# Patient Record
Sex: FEMALE | Race: WHITE | NOT HISPANIC OR LATINO | Employment: FULL TIME | URBAN - METROPOLITAN AREA
[De-identification: names, ages, dates, MRNs, and addresses within clinical notes are randomized per-mention and may not be internally consistent; named-entity substitution may affect disease eponyms.]

---

## 2022-01-10 ENCOUNTER — OFFICE VISIT (OUTPATIENT)
Dept: FAMILY MEDICINE CLINIC | Facility: CLINIC | Age: 57
End: 2022-01-10

## 2022-01-10 VITALS
HEIGHT: 67 IN | OXYGEN SATURATION: 96 % | SYSTOLIC BLOOD PRESSURE: 128 MMHG | BODY MASS INDEX: 27.47 KG/M2 | DIASTOLIC BLOOD PRESSURE: 74 MMHG | WEIGHT: 175 LBS | TEMPERATURE: 98.1 F | HEART RATE: 94 BPM

## 2022-01-10 DIAGNOSIS — M51.36 DDD (DEGENERATIVE DISC DISEASE), LUMBAR: ICD-10-CM

## 2022-01-10 DIAGNOSIS — G89.29 CHRONIC HIP PAIN, BILATERAL: ICD-10-CM

## 2022-01-10 DIAGNOSIS — M16.0 OSTEOARTHRITIS OF BOTH HIPS, UNSPECIFIED OSTEOARTHRITIS TYPE: ICD-10-CM

## 2022-01-10 DIAGNOSIS — M54.41 CHRONIC BILATERAL LOW BACK PAIN WITH RIGHT-SIDED SCIATICA: ICD-10-CM

## 2022-01-10 DIAGNOSIS — G89.29 CHRONIC BILATERAL LOW BACK PAIN WITH RIGHT-SIDED SCIATICA: ICD-10-CM

## 2022-01-10 DIAGNOSIS — M25.552 CHRONIC HIP PAIN, BILATERAL: ICD-10-CM

## 2022-01-10 DIAGNOSIS — Z76.89 ENCOUNTER TO ESTABLISH CARE: Primary | ICD-10-CM

## 2022-01-10 DIAGNOSIS — M25.551 CHRONIC HIP PAIN, BILATERAL: ICD-10-CM

## 2022-01-10 PROBLEM — Z72.0 TOBACCO ABUSE: Status: ACTIVE | Noted: 2022-01-10

## 2022-01-10 PROBLEM — Z90.711 S/P PARTIAL HYSTERECTOMY: Status: ACTIVE | Noted: 2022-01-10

## 2022-01-10 PROBLEM — M54.12 BRACHIAL NEURITIS OR RADICULITIS: Status: ACTIVE | Noted: 2017-06-21

## 2022-01-10 PROBLEM — G47.00 CANNOT SLEEP: Status: ACTIVE | Noted: 2022-01-10

## 2022-01-10 PROBLEM — J42 CHRONIC BRONCHITIS: Status: ACTIVE | Noted: 2022-01-10

## 2022-01-10 PROBLEM — M67.912 DISORDER OF LEFT ROTATOR CUFF: Status: ACTIVE | Noted: 2017-06-21

## 2022-01-10 PROBLEM — K58.9 IBS (IRRITABLE BOWEL SYNDROME): Status: ACTIVE | Noted: 2022-01-10

## 2022-01-10 PROBLEM — F32.A DEPRESSION: Status: ACTIVE | Noted: 2022-01-10

## 2022-01-10 PROBLEM — C44.91 BASAL CELL CANCER: Status: ACTIVE | Noted: 2022-01-10

## 2022-01-10 PROBLEM — M54.2 NECK PAIN: Status: ACTIVE | Noted: 2017-06-21

## 2022-01-10 PROBLEM — M50.30 DDD (DEGENERATIVE DISC DISEASE), CERVICAL: Status: ACTIVE | Noted: 2017-06-21

## 2022-01-10 PROCEDURE — 96372 THER/PROPH/DIAG INJ SC/IM: CPT | Performed by: NURSE PRACTITIONER

## 2022-01-10 PROCEDURE — 99203 OFFICE O/P NEW LOW 30 MIN: CPT | Performed by: NURSE PRACTITIONER

## 2022-01-10 RX ORDER — BUPROPION HYDROCHLORIDE 300 MG/1
300 TABLET ORAL EVERY MORNING
COMMUNITY
Start: 2022-01-05

## 2022-01-10 RX ORDER — OXCARBAZEPINE 600 MG/1
600 TABLET, FILM COATED ORAL 2 TIMES DAILY
COMMUNITY
Start: 2021-12-08

## 2022-01-10 RX ORDER — TIZANIDINE 4 MG/1
4 TABLET ORAL NIGHTLY PRN
Qty: 30 TABLET | Refills: 0 | Status: SHIPPED | OUTPATIENT
Start: 2022-01-10

## 2022-01-10 RX ORDER — HYDROXYZINE PAMOATE 25 MG/1
CAPSULE ORAL
COMMUNITY
Start: 2021-11-18 | End: 2022-01-14

## 2022-01-10 RX ORDER — METHYLPREDNISOLONE SODIUM SUCCINATE 40 MG/ML
40 INJECTION, POWDER, LYOPHILIZED, FOR SOLUTION INTRAMUSCULAR; INTRAVENOUS ONCE
Status: COMPLETED | OUTPATIENT
Start: 2022-01-10 | End: 2022-01-10

## 2022-01-10 RX ORDER — TRAZODONE HYDROCHLORIDE 100 MG/1
100 TABLET ORAL
COMMUNITY
Start: 2021-12-08

## 2022-01-10 RX ORDER — METHYLPREDNISOLONE 4 MG/1
TABLET ORAL
Qty: 21 EACH | Refills: 0 | Status: SHIPPED | OUTPATIENT
Start: 2022-01-10

## 2022-01-10 RX ADMIN — METHYLPREDNISOLONE SODIUM SUCCINATE 40 MG: 40 INJECTION, POWDER, LYOPHILIZED, FOR SOLUTION INTRAMUSCULAR; INTRAVENOUS at 10:57

## 2022-01-10 NOTE — PROGRESS NOTES
Please mail letter to patient stating    Deb the x-rays of the lumbar spine were read as multilevel degenerative changes with the greatest level at the lower lumbar spine so we will go ahead and proceed with that MRI if possible and then if it is not approved at this point in time then we will recommend physical therapy first and then attempt to reorder the MRI
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
## 2022-01-10 NOTE — PROGRESS NOTES
Please mail letter to patient stating    Deb at the x-rays of the hips were read as mild bilateral hip osteoarthritis and that there was mild marginal spurring at the left and right femoral heads and then we will just wait and see how you respond to the steroid pack and the shot that we gave and then we do already have the referral in to the orthopedist if needed

## 2022-01-14 ENCOUNTER — PATIENT ROUNDING (BHMG ONLY) (OUTPATIENT)
Dept: FAMILY MEDICINE CLINIC | Facility: CLINIC | Age: 57
End: 2022-01-14

## 2022-01-14 DIAGNOSIS — F40.240 CLAUSTROPHOBIA: Primary | ICD-10-CM

## 2022-01-14 RX ORDER — HYDROXYZINE PAMOATE 25 MG/1
CAPSULE ORAL
Qty: 2 CAPSULE | Refills: 0 | Status: SHIPPED | OUTPATIENT
Start: 2022-01-14

## 2022-01-14 NOTE — PROGRESS NOTES
January 14, 2022    Hello, may I speak with Deb Rayo?    My name is Guera Fuller      [place] [facility]  [address] [place] [address] [address] [id number] [phone].    Before we get started may I verify your date of birth? 1965    I am calling to officially welcome you to our practice and ask about your recent visit. Is this a good time to talk? yes    Tell me about your visit with us. What things went well?  getting answers on health       We're always looking for ways to make our patients' experiences even better. Do you have recommendations on ways we may improve?  yes, patient felt a little rushed    Overall were you satisfied with your first visit to our practice? yes       I appreciate you taking the time to speak with me today. Is there anything else I can do for you? Yes, patient needs medicine for mri      Thank you, and have a great day.

## 2022-01-20 ENCOUNTER — OFFICE VISIT (OUTPATIENT)
Dept: ORTHOPEDIC SURGERY | Facility: CLINIC | Age: 57
End: 2022-01-20

## 2022-01-20 VITALS — HEIGHT: 67 IN | WEIGHT: 175 LBS | OXYGEN SATURATION: 98 % | BODY MASS INDEX: 27.47 KG/M2 | HEART RATE: 88 BPM

## 2022-01-20 DIAGNOSIS — M70.62 TROCHANTERIC BURSITIS OF BOTH HIPS: Primary | ICD-10-CM

## 2022-01-20 DIAGNOSIS — M70.61 TROCHANTERIC BURSITIS OF BOTH HIPS: Primary | ICD-10-CM

## 2022-01-20 PROCEDURE — 99203 OFFICE O/P NEW LOW 30 MIN: CPT | Performed by: ORTHOPAEDIC SURGERY

## 2022-01-20 RX ORDER — DICLOFENAC SODIUM 75 MG/1
75 TABLET, DELAYED RELEASE ORAL 2 TIMES DAILY
Qty: 60 TABLET | Refills: 1 | Status: SHIPPED | OUTPATIENT
Start: 2022-01-20 | End: 2022-03-22

## 2022-01-20 NOTE — PROGRESS NOTES
"Chief Complaint  Pain of the Left Hip and Pain of the Right Hip     Subjective      Deb Rayo presents to [facility] for an evaluation of bilateral hip pain. Patient has been having increasing pain in bilateral hips. She had a steroid injection and steroid pack given to her by her PCP. This has given her some relief. Applying pressure to one of her legs causes her hips to give way. Patient has been having pain in her hip for about 2 years. She points to the lateral aspect of bilateral hips as her main source of pain. She also has back issues.     Allergies   Allergen Reactions   • Morphine Itching        Social History     Socioeconomic History   • Marital status: Single   Tobacco Use   • Smoking status: Current Every Day Smoker     Packs/day: 1.00     Types: Cigarettes   • Smokeless tobacco: Never Used   Vaping Use   • Vaping Use: Never used   Substance and Sexual Activity   • Alcohol use: Not Currently   • Drug use: Defer   • Sexual activity: Not Currently        Review of Systems     Objective   Vital Signs:   Pulse 88   Ht 170.2 cm (67\")   Wt 79.4 kg (175 lb)   SpO2 98%   BMI 27.41 kg/m²       Physical Exam  Constitutional:       Appearance: Normal appearance. Patient is well-developed and normal weight.   HENT:      Head: Normocephalic.      Right Ear: Hearing and external ear normal.      Left Ear: Hearing and external ear normal.      Nose: Nose normal.   Eyes:      Conjunctiva/sclera: Conjunctivae normal.   Cardiovascular:      Rate and Rhythm: Normal rate.   Pulmonary:      Effort: Pulmonary effort is normal.      Breath sounds: No wheezing or rales.   Abdominal:      Palpations: Abdomen is soft.      Tenderness: There is no abdominal tenderness.   Musculoskeletal:      Cervical back: Normal range of motion.   Skin:     Findings: No rash.   Neurological:      Mental Status: Patient is alert and oriented to person, place, and time.   Psychiatric:         Mood and " Affect: Mood and affect normal.         Judgment: Judgment normal.       Ortho Exam      RIGHT HIP: Non-antalgic gait. Good tone of hip flexors, hip extensors, hip adductor, hip abductors. Good strength to hamstrings, quadriceps, dorsiflexors and plantar flexors. Stable to varus/valgus stress. Calf supple, non-tender, no signs of DVT. Dorsal Pedal Pulse 2+, posterior tibialis pulse 2+. Tender lateral hip and thigh.     LEFT HIP: Good tone of hip flexors, hip extensors, hip adductor, hip abductors. Good strength to hamstrings, quadriceps, dorsiflexors and plantar flexors. Stable to varus/valgus stress. Tender lateral hip. No groin pain. Full passive hip range of motion. Calf supple, non-tender, no signs of DVT. Dorsal Pedal Pulse 2+, posterior tibialis pulse 2+.       Procedures      Imaging Results (Most Recent)     None           Result Review :              XR Hips Bilateral With or Without Pelvis 2 View    Result Date: 1/10/2022  Narrative: PROCEDURE: XR HIPS BILATERAL W OR WO PELVIS 2 VIEW  COMPARISON: None  INDICATIONS: worsening hip pain and R>L and right leg at times gives out  FINDINGS:  Mild marginal spurring at the left and right femoral heads related to osteoarthritis.  No fracture.  No aggressive osseous lesion.  The iliopectineal and ilioischial lines are intact.  Sacroiliac joints appear within normal limits.  Sacral arcuate lines appear intact.  Small pelvic phleboliths noted on the right.      Impression:   1. Negative for acute osseous abnormality. 2. Mild bilateral hip osteoarthritis.      MORRO MELO MD       Electronically Signed and Approved By: MORRO MELO MD on 1/10/2022 at 10:26                      Assessment and Plan     DX: Bilateral hip trochanteric bursitis     Discussed treatment plans and diagnosis with the patient. A prescription for PT given. At home exercises given as well. Anti-inflammatory prescribed.     Call or return if worsening symptoms.    Follow Up     4-5 weeks.        Patient was given instructions and counseling regarding her condition or for health maintenance advice. Please see specific information pulled into the AVS if appropriate.     Scribed for Em Valencia MD by Yani Mckeon.  01/20/22   08:46 EST        I have personally performed the services described in this document as scribed by the above individual and it is both accurate and complete. Em Valencia MD 01/20/22

## 2022-01-26 ENCOUNTER — APPOINTMENT (OUTPATIENT)
Dept: MRI IMAGING | Facility: HOSPITAL | Age: 57
End: 2022-01-26

## 2022-02-07 ENCOUNTER — TREATMENT (OUTPATIENT)
Dept: PHYSICAL THERAPY | Facility: CLINIC | Age: 57
End: 2022-02-07

## 2022-02-07 DIAGNOSIS — M70.62 TROCHANTERIC BURSITIS OF BOTH HIPS: Primary | ICD-10-CM

## 2022-02-07 DIAGNOSIS — M51.36 DDD (DEGENERATIVE DISC DISEASE), LUMBAR: ICD-10-CM

## 2022-02-07 DIAGNOSIS — M70.61 TROCHANTERIC BURSITIS OF BOTH HIPS: Primary | ICD-10-CM

## 2022-02-07 PROCEDURE — 97162 PT EVAL MOD COMPLEX 30 MIN: CPT | Performed by: PHYSICAL THERAPIST

## 2022-02-07 PROCEDURE — 97110 THERAPEUTIC EXERCISES: CPT | Performed by: PHYSICAL THERAPIST
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
## 2022-02-23 ENCOUNTER — TREATMENT (OUTPATIENT)
Dept: PHYSICAL THERAPY | Facility: CLINIC | Age: 57
End: 2022-02-23

## 2022-02-23 DIAGNOSIS — M70.61 TROCHANTERIC BURSITIS OF BOTH HIPS: Primary | ICD-10-CM

## 2022-02-23 DIAGNOSIS — M51.36 DDD (DEGENERATIVE DISC DISEASE), LUMBAR: ICD-10-CM

## 2022-02-23 DIAGNOSIS — M70.62 TROCHANTERIC BURSITIS OF BOTH HIPS: Primary | ICD-10-CM

## 2022-02-23 PROCEDURE — 97110 THERAPEUTIC EXERCISES: CPT | Performed by: PHYSICAL THERAPIST

## 2022-02-23 NOTE — PROGRESS NOTES
Physical Therapy Daily Treatment Note      Patient: Deb Rayo   : 1965  Referring practitioner: No ref. provider found  Date of Initial Visit: Type: THERAPY  Noted: 2022  Today's Date: 2022  Patient seen for 2 sessions       Visit Diagnoses:    ICD-10-CM ICD-9-CM   1. Trochanteric bursitis of both hips  M70.61 726.5    M70.62    2. DDD (degenerative disc disease), lumbar  M51.36 722.52       Subjective Evaluation    History of Present Illness    Subjective comment: doing some better. trying to stretch during her 30 minute lunch break. Feeling exhausted by the end of the week and not sleeping well. trying to use pillow between her legs at night. will try to use ice at night. lumbar spine also giving out some which concerns her. denies any giving out lately.        Objective   See Exercise, Manual, and Modality Logs for complete treatment.       Assessment & Plan     Assessment    Assessment details: Taking her voltaren and will add ice to program. Added more swiss ball to floor work. Gave pt info on standing stool to help with back rest when standing at work. Also a better seat cushion for her chair since she is tall and tends to go into lumbar flexion or cross her legs when uncomfortable.     P: keep working on mobility and strength as much as possible, and avoid need for MRI if pain levels drop enough.          Timed:         Manual Therapy:         mins  31185;     Therapeutic Exercise:    30     mins  75724;     Neuromuscular Brian:        mins  58836;    Therapeutic Activity:          mins  22318;     Gait Training:           mins  94362;     Ultrasound:          mins  41534;    Ionto                                   mins   91490  Self Care                            mins   71733  Canalith Repos         mins 70319      Un-Timed:  Electrical Stimulation:         mins  32629 ( );  Dry Needling          mins self-pay  Traction          mins 51727      Timed Treatment:   30   mins    Total Treatment:     30   mins    Zorre Zeno Kimura, PT  [place] License: [id number]    [place] License:  [id number]

## 2022-03-02 ENCOUNTER — TELEPHONE (OUTPATIENT)
Dept: PHYSICAL THERAPY | Facility: CLINIC | Age: 57
End: 2022-03-02

## 2022-03-09 ENCOUNTER — TREATMENT (OUTPATIENT)
Dept: PHYSICAL THERAPY | Facility: CLINIC | Age: 57
End: 2022-03-09

## 2022-03-09 DIAGNOSIS — M51.36 DDD (DEGENERATIVE DISC DISEASE), LUMBAR: ICD-10-CM

## 2022-03-09 DIAGNOSIS — M70.61 TROCHANTERIC BURSITIS OF BOTH HIPS: Primary | ICD-10-CM

## 2022-03-09 DIAGNOSIS — M70.62 TROCHANTERIC BURSITIS OF BOTH HIPS: Primary | ICD-10-CM

## 2022-03-09 PROCEDURE — 97530 THERAPEUTIC ACTIVITIES: CPT | Performed by: PHYSICAL THERAPIST

## 2022-03-09 PROCEDURE — 97110 THERAPEUTIC EXERCISES: CPT | Performed by: PHYSICAL THERAPIST

## 2022-03-09 NOTE — PROGRESS NOTES
Physical Therapy Daily Treatment Note/30 day re-assess      Patient: Deb Rayo   : 1965  Referring practitioner: Em Valencia MD  Date of Initial Visit: Type: THERAPY  Noted: 2022  Today's Date: 3/9/2022  Patient seen for 3 sessions       Visit Diagnoses:    ICD-10-CM ICD-9-CM   1. Trochanteric bursitis of both hips  M70.61 726.5    M70.62    2. DDD (degenerative disc disease), lumbar  M51.36 722.52       Subjective Evaluation    History of Present Illness    Subjective comment: struggling but trying ice, more pillows, seat cushions. can't stand at work yet but might consider it if pain increases       Objective   See Exercise, Manual, and Modality Logs for complete treatment.       Assessment & Plan     Assessment    Assessment details: Goals  Plan Goals: STGs 4 weeks:  1. Pt to follow daily ex program to break up prolonged sitting. MET exercising during breaks   2. Pt to increase hip ROM by 5 degrees, gregg ext and rotation. Ongoing. Still tight hip extension.   3. Pt to increase R hip and knee strength 1/2 grade to 4/5. Ongoing. Still 4-  4. Pt to be able to walk 30 minutes with no LE pain. NOT MET. Maybe 15 minutes.   5. LEFS score to improve from 25 to > 35/80. NOT MET. 22/80  6. Pt to state improved sleep with body pillows or supine wedge. Partially met. Hard to keep body pillow between her legs and still waking up every 2 hours.   7. Manage stairs reciprocally. Partially met. Okay for 2-3 steps but doesn't trust R leg on a full flight of steeper steps.   LTGs 12 weeks:  1. Pt to report improving overall status and quality of life, vs. Need for MRI and neuro consult. NOT MET. Doesn't want surgery but wants more info. Might consider injections.   2. Pt to be able to walk 60 minutes at the mall per her personal goal, without R hip giving way or increased LE/lumbar pains. NOT MET  3. Pt to be indep with HEP she can do while at work, and more in depth program to follow on weekends. ONGOING  4.  Both hips to have painfree end range rotation and extension of fair ROM value. Ongoing. Still pain at end range  5. Pt to be able to keep core contracted during ADLs indep of cueing from PT. Ongoing and improved but admits she can get better with this.   6. LEFS > 50/80 by dc. NOT MET  7. SLS R LE > 20 seconds. MET but pronating and needs better arch support.     Pt seen for just her 3rd session today after missing last week. Good relief from SL drop off opening L spine on either side. Will add this to program at lunch and at the end of the day. Reviewed her HEP and gregg encouraged her to do more active stretching during the day standing vs. Sitting.   Pt will contact ortho to discuss a referral to spine MD for further info and direction which she is requesting.   P: consider SL drop off bilateral LEs for spine gapping.           Timed:         Manual Therapy:         mins  03172;     Therapeutic Exercise:    15     mins  99098;     Neuromuscular Brian:        mins  28830;    Therapeutic Activity:     15     mins  90208;     Gait Training:           mins  63256;     Ultrasound:          mins  61027;    Ionto                                   mins   28803  Self Care                            mins   89632  Canalith Repos         mins 54812      Un-Timed:  Electrical Stimulation:         mins  99175 ( );  Dry Needling          mins self-pay  Traction          mins 02930      Timed Treatment:   30   mins   Total Treatment:     30   mins    Zorre Zeno Kimura, PT  [place] License: [id number]    [place] License:  [id number]

## 2022-03-22 DIAGNOSIS — M70.62 TROCHANTERIC BURSITIS OF BOTH HIPS: ICD-10-CM

## 2022-03-22 DIAGNOSIS — M70.61 TROCHANTERIC BURSITIS OF BOTH HIPS: ICD-10-CM

## 2022-03-22 RX ORDER — DICLOFENAC SODIUM 75 MG/1
TABLET, DELAYED RELEASE ORAL
Qty: 60 TABLET | Refills: 1 | Status: SHIPPED | OUTPATIENT
Start: 2022-03-22 | End: 2022-06-20

## 2022-06-08 ENCOUNTER — TELEPHONE (OUTPATIENT)
Dept: FAMILY MEDICINE CLINIC | Facility: CLINIC | Age: 57
End: 2022-06-08

## 2022-06-09 ENCOUNTER — CLINICAL SUPPORT (OUTPATIENT)
Dept: FAMILY MEDICINE CLINIC | Facility: CLINIC | Age: 57
End: 2022-06-09

## 2022-06-09 VITALS — OXYGEN SATURATION: 98 % | TEMPERATURE: 97.8 F | HEART RATE: 82 BPM

## 2022-06-09 DIAGNOSIS — S51.012A ELBOW LACERATION, LEFT, INITIAL ENCOUNTER: Primary | ICD-10-CM

## 2022-06-09 PROCEDURE — 99212 OFFICE O/P EST SF 10 MIN: CPT | Performed by: NURSE PRACTITIONER

## 2022-06-09 NOTE — PROGRESS NOTES
Chief Complaint  Laceration (Cut to left elbow last night around 10:00 pm on night stand.  Patient didn't want to go to the ER last night.)    Subjective            Deb Rayo presents to [facility]  Patient here today to have her arm/elbow looked at as she hit her elbow on an end table at around 10 PM last night and had a friend come over and help her bandage it and clean it up and did not go to the emergency room and called here at 10 AM this morning and had already been greater than 12 hours when it happened and then came today to the clinic around 12 to 12:15 PM to have it looked at and to get an updated tetanus      PHQ-2 Total Score:    PHQ-9 Total Score:      History reviewed. No pertinent past medical history.    Allergies   Allergen Reactions   • Morphine Itching        History reviewed. No pertinent surgical history.     Social History     Tobacco Use   • Smoking status: Current Every Day Smoker     Packs/day: 1.00     Types: Cigarettes   • Smokeless tobacco: Never Used   Vaping Use   • Vaping Use: Never used   Substance Use Topics   • Alcohol use: Not Currently   • Drug use: Defer       History reviewed. No pertinent family history.     Health Maintenance Due   Topic Date Due   • MAMMOGRAM  Never done   • COLORECTAL CANCER SCREENING  Never done   • ANNUAL PHYSICAL  Never done   • Pneumococcal Vaccine 0-64 (1 - PCV) Never done   • ZOSTER VACCINE (1 of 2) Never done   • HEPATITIS C SCREENING  Never done   • PAP SMEAR  Never done   • COVID-19 Vaccine (3 - Booster) 01/25/2022        Current Outpatient Medications on File Prior to Visit   Medication Sig   • buPROPion XL (WELLBUTRIN XL) 300 MG 24 hr tablet Take 300 mg by mouth Every Morning.   • diclofenac (VOLTAREN) 75 MG EC tablet TAKE 1 TABLET BY MOUTH TWICE DAILY   • hydrOXYzine pamoate (VISTARIL) 25 MG capsule Take 1-2 tabs 20-30 min prior to having MRI   • OXcarbazepine (TRILEPTAL) 600 MG tablet Take 600 mg by mouth  2 (Two) Times a Day.   • tiZANidine (Zanaflex) 4 MG tablet Take 1 tablet by mouth At Night As Needed for Muscle Spasms.   • traZODone (DESYREL) 100 MG tablet Take 100 mg by mouth every night at bedtime.   • methylPREDNISolone (MEDROL) 4 MG dose pack Take as directed on package instructions.     No current facility-administered medications on file prior to visit.       Immunization History   Administered Date(s) Administered   • COVID-19 (MODERNA) 1st, 2nd, 3rd Dose Only 07/28/2021, 08/25/2021   • COVID-19 (UNSPECIFIED) 07/28/2021, 08/25/2021   • Flu Vaccine Quad PF >36MO 11/04/2021   • Tdap 03/13/2019       Review of Systems   Constitutional: Negative for fever.   Musculoskeletal: Negative for arthralgias and myalgias.   Skin: Positive for wound.   Neurological: Negative for numbness.        Objective     Pulse 82   Temp 97.8 °F (36.6 °C)   SpO2 98%       Physical Exam  Vitals and nursing note reviewed.   Constitutional:       Appearance: Normal appearance.   HENT:      Head: Normocephalic.      Mouth/Throat:      Comments: wearing mask  Eyes:      Pupils: Pupils are equal, round, and reactive to light.   Pulmonary:      Effort: Pulmonary effort is normal.   Musculoskeletal:         General: Signs of injury present. No swelling, tenderness or deformity. Normal range of motion.      Right lower leg: No edema.      Left lower leg: No edema.   Skin:     General: Skin is warm and dry.   Neurological:      Mental Status: She is alert and oriented to person, place, and time.   Psychiatric:         Mood and Affect: Mood normal.         Behavior: Behavior normal.         Thought Content: Thought content normal.         Judgment: Judgment normal.         Result Review :                 Laceration Repair    Date/Time: 6/9/2022 12:15 PM  Performed by: Estela Lopez APRN  Authorized by: Estela Lopez APRN   Body area: upper extremity  Location details: right elbow  Foreign bodies: no foreign bodies  Tendon  involvement: none  Nerve involvement: none  Vascular damage: no    Sedation:  Patient sedated: no    Skin closure: Steri-Strips  Patient tolerance: patient tolerated the procedure well with no immediate complications  Comments: Had already been greater than 14 hours since the patient did the laceration at home--was to the left elbow and had stopped bleeding was superficial no signs and symptoms of infection noted full range of motion and Steri-Strips applied is already very well cleansed and patient given instructions on what signs and symptoms to watch for              Assessment and Plan      Diagnoses and all orders for this visit:    1. Elbow laceration, left, initial encounter (Primary)    Other orders  -     Laceration Repair    TDAP was last given 3/13/2019    Evaluated patient's elbow and gave guidance with the medical assistant on the Steri-Strips application and discussed with the patient in detail signs and symptoms to watch for for any signs of infection and to contact the office immediately        Follow Up     Return if symptoms worsen or fail to improve.

## 2022-06-18 DIAGNOSIS — M70.62 TROCHANTERIC BURSITIS OF BOTH HIPS: ICD-10-CM

## 2022-06-18 DIAGNOSIS — M70.61 TROCHANTERIC BURSITIS OF BOTH HIPS: ICD-10-CM

## 2022-06-20 RX ORDER — DICLOFENAC SODIUM 75 MG/1
TABLET, DELAYED RELEASE ORAL
Qty: 60 TABLET | Refills: 1 | Status: SHIPPED | OUTPATIENT
Start: 2022-06-20 | End: 2022-09-26

## 2022-09-25 DIAGNOSIS — M70.61 TROCHANTERIC BURSITIS OF BOTH HIPS: ICD-10-CM

## 2022-09-25 DIAGNOSIS — M70.62 TROCHANTERIC BURSITIS OF BOTH HIPS: ICD-10-CM

## 2022-09-26 RX ORDER — DICLOFENAC SODIUM 75 MG/1
TABLET, DELAYED RELEASE ORAL
Qty: 60 TABLET | Refills: 2 | Status: SHIPPED | OUTPATIENT
Start: 2022-09-26 | End: 2023-02-06

## 2022-12-13 ENCOUNTER — APPOINTMENT (OUTPATIENT)
Dept: CT IMAGING | Facility: HOSPITAL | Age: 57
End: 2022-12-13

## 2022-12-13 ENCOUNTER — HOSPITAL ENCOUNTER (EMERGENCY)
Facility: HOSPITAL | Age: 57
Discharge: HOME | End: 2022-12-13
Attending: EMERGENCY MEDICINE | Admitting: EMERGENCY MEDICINE

## 2022-12-13 ENCOUNTER — APPOINTMENT (OUTPATIENT)
Dept: GENERAL RADIOLOGY | Facility: HOSPITAL | Age: 57
End: 2022-12-13

## 2022-12-13 VITALS
OXYGEN SATURATION: 98 % | WEIGHT: 176.37 LBS | DIASTOLIC BLOOD PRESSURE: 77 MMHG | HEIGHT: 67 IN | TEMPERATURE: 98 F | BODY MASS INDEX: 27.68 KG/M2 | RESPIRATION RATE: 18 BRPM | SYSTOLIC BLOOD PRESSURE: 147 MMHG | HEART RATE: 81 BPM

## 2022-12-13 DIAGNOSIS — R55 SYNCOPE, UNSPECIFIED SYNCOPE TYPE: Primary | ICD-10-CM

## 2022-12-13 LAB
ALBUMIN SERPL-MCNC: 4.6 G/DL (ref 3.5–5.2)
ALBUMIN/GLOB SERPL: 1.8 G/DL
ALP SERPL-CCNC: 94 U/L (ref 39–117)
ALT SERPL W P-5'-P-CCNC: 13 U/L (ref 1–33)
ANION GAP SERPL CALCULATED.3IONS-SCNC: 11 MMOL/L (ref 5–15)
AST SERPL-CCNC: 18 U/L (ref 1–32)
BASOPHILS # BLD AUTO: 0.1 10*3/MM3 (ref 0–0.2)
BASOPHILS NFR BLD AUTO: 0.9 % (ref 0–1.5)
BILIRUB SERPL-MCNC: 0.3 MG/DL (ref 0–1.2)
BUN SERPL-MCNC: 16 MG/DL (ref 6–20)
BUN/CREAT SERPL: 16.8 (ref 7–25)
CALCIUM SPEC-SCNC: 9.6 MG/DL (ref 8.6–10.5)
CHLORIDE SERPL-SCNC: 102 MMOL/L (ref 98–107)
CK SERPL-CCNC: 47 U/L (ref 20–180)
CO2 SERPL-SCNC: 25 MMOL/L (ref 22–29)
CREAT SERPL-MCNC: 0.95 MG/DL (ref 0.57–1)
DEPRECATED RDW RBC AUTO: 52.1 FL (ref 37–54)
EGFRCR SERPLBLD CKD-EPI 2021: 69.6 ML/MIN/1.73
EOSINOPHIL # BLD AUTO: 0.1 10*3/MM3 (ref 0–0.4)
EOSINOPHIL NFR BLD AUTO: 1.2 % (ref 0.3–6.2)
ERYTHROCYTE [DISTWIDTH] IN BLOOD BY AUTOMATED COUNT: 14.3 % (ref 12.3–15.4)
FLUAV SUBTYP SPEC NAA+PROBE: NOT DETECTED
FLUBV RNA ISLT QL NAA+PROBE: NOT DETECTED
GLOBULIN UR ELPH-MCNC: 2.5 GM/DL
GLUCOSE SERPL-MCNC: 106 MG/DL (ref 65–99)
HCT VFR BLD AUTO: 41.6 % (ref 34–46.6)
HGB BLD-MCNC: 13.9 G/DL (ref 12–15.9)
LIPASE SERPL-CCNC: 26 U/L (ref 13–60)
LYMPHOCYTES # BLD AUTO: 1.5 10*3/MM3 (ref 0.7–3.1)
LYMPHOCYTES NFR BLD AUTO: 25.7 % (ref 19.6–45.3)
MAGNESIUM SERPL-MCNC: 2 MG/DL (ref 1.6–2.6)
MCH RBC QN AUTO: 33.2 PG (ref 26.6–33)
MCHC RBC AUTO-ENTMCNC: 33.5 G/DL (ref 31.5–35.7)
MCV RBC AUTO: 99 FL (ref 79–97)
MONOCYTES # BLD AUTO: 0.5 10*3/MM3 (ref 0.1–0.9)
MONOCYTES NFR BLD AUTO: 8.7 % (ref 5–12)
NEUTROPHILS NFR BLD AUTO: 3.7 10*3/MM3 (ref 1.7–7)
NEUTROPHILS NFR BLD AUTO: 63.5 % (ref 42.7–76)
NRBC BLD AUTO-RTO: 0.2 /100 WBC (ref 0–0.2)
PLATELET # BLD AUTO: 192 10*3/MM3 (ref 140–450)
PMV BLD AUTO: 9.9 FL (ref 6–12)
POTASSIUM SERPL-SCNC: 4.2 MMOL/L (ref 3.5–5.2)
PROT SERPL-MCNC: 7.1 G/DL (ref 6–8.5)
RBC # BLD AUTO: 4.2 10*6/MM3 (ref 3.77–5.28)
SARS-COV-2 RNA PNL SPEC NAA+PROBE: NOT DETECTED
SODIUM SERPL-SCNC: 138 MMOL/L (ref 136–145)
TROPONIN T SERPL-MCNC: <0.01 NG/ML (ref 0–0.03)
TSH SERPL DL<=0.05 MIU/L-ACNC: 0.94 UIU/ML (ref 0.27–4.2)
WBC NRBC COR # BLD: 5.9 10*3/MM3 (ref 3.4–10.8)

## 2022-12-13 PROCEDURE — 93005 ELECTROCARDIOGRAM TRACING: CPT

## 2022-12-13 PROCEDURE — 83735 ASSAY OF MAGNESIUM: CPT | Performed by: EMERGENCY MEDICINE

## 2022-12-13 PROCEDURE — 83690 ASSAY OF LIPASE: CPT | Performed by: EMERGENCY MEDICINE

## 2022-12-13 PROCEDURE — 71046 X-RAY EXAM CHEST 2 VIEWS: CPT

## 2022-12-13 PROCEDURE — 70450 CT HEAD/BRAIN W/O DYE: CPT

## 2022-12-13 PROCEDURE — 84484 ASSAY OF TROPONIN QUANT: CPT | Performed by: EMERGENCY MEDICINE

## 2022-12-13 PROCEDURE — 99283 EMERGENCY DEPT VISIT LOW MDM: CPT

## 2022-12-13 PROCEDURE — 87636 SARSCOV2 & INF A&B AMP PRB: CPT | Performed by: EMERGENCY MEDICINE

## 2022-12-13 PROCEDURE — 80050 GENERAL HEALTH PANEL: CPT | Performed by: EMERGENCY MEDICINE

## 2022-12-13 PROCEDURE — 93005 ELECTROCARDIOGRAM TRACING: CPT | Performed by: EMERGENCY MEDICINE

## 2022-12-13 PROCEDURE — 73630 X-RAY EXAM OF FOOT: CPT

## 2022-12-13 PROCEDURE — 82550 ASSAY OF CK (CPK): CPT | Performed by: EMERGENCY MEDICINE

## 2022-12-13 PROCEDURE — 36415 COLL VENOUS BLD VENIPUNCTURE: CPT

## 2022-12-13 RX ORDER — SODIUM CHLORIDE 0.9 % (FLUSH) 0.9 %
10 SYRINGE (ML) INJECTION AS NEEDED
Status: DISCONTINUED | OUTPATIENT
Start: 2022-12-13 | End: 2022-12-13 | Stop reason: HOSPADM

## 2022-12-13 RX ADMIN — SODIUM CHLORIDE, POTASSIUM CHLORIDE, SODIUM LACTATE AND CALCIUM CHLORIDE 1000 ML: 600; 310; 30; 20 INJECTION, SOLUTION INTRAVENOUS at 14:57

## 2022-12-13 NOTE — ED PROVIDER NOTES
Subjective   History of Present Illness  Chief complaint passed out and feel funny and lightheaded    History of present illness this is a 57-year-old female who states that Sunday night she started feeling like she had to go to the bathroom she had some abdominal cramping she went to the bathroom she got lightheaded and passed out hit her foot on something.  She states that she felt like she could be shaking.  She was not out long.  She had no loss of bladder bowel control and did not bite her tongue.  She states ever since then she is felt little woozy in her head.  There is no real headache or visual changes or speech difficulty there is no paralysis she is able to eat drink talk walk and function normally otherwise.  No chest pain neck arm jaw pain or shortness of breath.  She states that the rest that night she had vomiting and diarrhea.  That is since resolved but she was sitting at the computer today when her head started to feel funny and woozy.  She had a mild diffuse headache associated with throbbing in nature nonradiating.  Nothing really makes this better or worse ongoing since Sunday moderate degree.  There is no thunderclap there is no other slurred speech or paralysis associated with this.  No other fever chills.  No cough congestion.  No one at home with similar illness.  And denies any other complaints or associated symptoms at this time has been at home treating symptomatic        Review of Systems   Constitutional: Negative for chills and fever.   HENT: Negative for congestion and sinus pressure.    Eyes: Negative for photophobia and visual disturbance.   Respiratory: Negative for chest tightness and shortness of breath.    Cardiovascular: Negative for chest pain and palpitations.   Gastrointestinal: Positive for abdominal pain, diarrhea and vomiting. Negative for blood in stool.   Endocrine: Negative for cold intolerance and heat intolerance.   Genitourinary: Negative for difficulty urinating  and dysuria.   Musculoskeletal: Negative for back pain and neck pain.   Skin: Negative for rash and wound.   Neurological: Positive for dizziness, light-headedness and headaches. Negative for facial asymmetry and speech difficulty.   Psychiatric/Behavioral: Negative for agitation and confusion.       No past medical history on file.    Allergies   Allergen Reactions   • Morphine Itching       No past surgical history on file.    No family history on file.    Social History     Socioeconomic History   • Marital status: Single   Tobacco Use   • Smoking status: Every Day     Packs/day: 1.00     Types: Cigarettes   • Smokeless tobacco: Never   Vaping Use   • Vaping Use: Never used   Substance and Sexual Activity   • Alcohol use: Not Currently   • Drug use: Defer   • Sexual activity: Not Currently     Prior to Admission medications    Medication Sig Start Date End Date Taking? Authorizing Provider   buPROPion XL (WELLBUTRIN XL) 300 MG 24 hr tablet Take 300 mg by mouth Every Morning. 1/5/22   Ba Fung MD   diclofenac (VOLTAREN) 75 MG EC tablet TAKE 1 TABLET BY MOUTH TWICE DAILY 9/26/22   Em Valencia MD   hydrOXYzine pamoate (VISTARIL) 25 MG capsule Take 1-2 tabs 20-30 min prior to having MRI 1/14/22   Estela Lopez APRN   methylPREDNISolone (MEDROL) 4 MG dose pack Take as directed on package instructions. 1/10/22   Estela Lopez APRN   OXcarbazepine (TRILEPTAL) 600 MG tablet Take 600 mg by mouth 2 (Two) Times a Day. 12/8/21   Ba Fung MD   tiZANidine (Zanaflex) 4 MG tablet Take 1 tablet by mouth At Night As Needed for Muscle Spasms. 1/10/22   Estela Lopez APRN   traZODone (DESYREL) 100 MG tablet Take 100 mg by mouth every night at bedtime. 12/8/21   Ba Fung MD           Objective   Physical Exam  Constitutional 57-year-old female awake alert no distress temperature is 98 heart rate is 81 blood pressure 147/77 respirations 18 sats 98% room air.  HEENT  extraocular muscles are intact pupils equal reactive sclerae clear no photophobia is no nystagmus mouth clear.  Neck supple no adenopathy no meningeal signs no JVD no bruits.  No cervical spine tenderness.  Lungs clear no retractions.  Heart regular without murmur.  Abdomen soft without tenderness good bowel sounds no peritoneal findings or pulsatile masses.  Extremities pulses equal throughout upper and lower extremities no edema no cords no Homans' sign no evidence of DVT.  Skin is warm and dry without rashes or cellulitic changes.  She has some bruising noted to the right foot across the big toe and the first metatarsal there is no Lisfranc joint tenderness.  There is no significant swelling some mild bruising swelling to that area toes up-and-down occluding big toe moves everything without difficulty.  No pain to the ankle.  No pain to the heel.  Neurologic awake alert orientated x4 no facial asymmetry normal speech peripheral vision intact confrontation there is no nystagmus tongue soft palate normal no drift the arms or legs normal finger-to-nose she walks without difficulty no ataxia.  Procedures           ED Course    EKG my interpretation normal sinus rhythm rate 81 normal axis no hypertrophy QTC of 434 normal EKG     CBC electrolytes troponin all normal  CT head without no acute findings.  Chest x-ray without acute findings.  X-ray of the right foot no acute finding        Results for orders placed or performed during the hospital encounter of 12/13/22   COVID-19 and FLU A/B PCR - Swab, Nasopharynx    Specimen: Nasopharynx; Swab   Result Value Ref Range    COVID19 Not Detected Not Detected - Ref. Range    Influenza A PCR Not Detected Not Detected    Influenza B PCR Not Detected Not Detected   Comprehensive Metabolic Panel    Specimen: Arm, Right; Blood   Result Value Ref Range    Glucose 106 (H) 65 - 99 mg/dL    BUN 16 6 - 20 mg/dL    Creatinine 0.95 0.57 - 1.00 mg/dL    Sodium 138 136 - 145 mmol/L     Potassium 4.2 3.5 - 5.2 mmol/L    Chloride 102 98 - 107 mmol/L    CO2 25.0 22.0 - 29.0 mmol/L    Calcium 9.6 8.6 - 10.5 mg/dL    Total Protein 7.1 6.0 - 8.5 g/dL    Albumin 4.60 3.50 - 5.20 g/dL    ALT (SGPT) 13 1 - 33 U/L    AST (SGOT) 18 1 - 32 U/L    Alkaline Phosphatase 94 39 - 117 U/L    Total Bilirubin 0.3 0.0 - 1.2 mg/dL    Globulin 2.5 gm/dL    A/G Ratio 1.8 g/dL    BUN/Creatinine Ratio 16.8 7.0 - 25.0    Anion Gap 11.0 5.0 - 15.0 mmol/L    eGFR 69.6 >60.0 mL/min/1.73   Lipase    Specimen: Arm, Right; Blood   Result Value Ref Range    Lipase 26 13 - 60 U/L   Troponin    Specimen: Arm, Right; Blood   Result Value Ref Range    Troponin T <0.010 0.000 - 0.030 ng/mL   CK    Specimen: Arm, Right; Blood   Result Value Ref Range    Creatine Kinase 47 20 - 180 U/L   Magnesium    Specimen: Arm, Right; Blood   Result Value Ref Range    Magnesium 2.0 1.6 - 2.6 mg/dL   TSH    Specimen: Arm, Right; Blood   Result Value Ref Range    TSH 0.943 0.270 - 4.200 uIU/mL   CBC Auto Differential    Specimen: Arm, Right; Blood   Result Value Ref Range    WBC 5.90 3.40 - 10.80 10*3/mm3    RBC 4.20 3.77 - 5.28 10*6/mm3    Hemoglobin 13.9 12.0 - 15.9 g/dL    Hematocrit 41.6 34.0 - 46.6 %    MCV 99.0 (H) 79.0 - 97.0 fL    MCH 33.2 (H) 26.6 - 33.0 pg    MCHC 33.5 31.5 - 35.7 g/dL    RDW 14.3 12.3 - 15.4 %    RDW-SD 52.1 37.0 - 54.0 fl    MPV 9.9 6.0 - 12.0 fL    Platelets 192 140 - 450 10*3/mm3    Neutrophil % 63.5 42.7 - 76.0 %    Lymphocyte % 25.7 19.6 - 45.3 %    Monocyte % 8.7 5.0 - 12.0 %    Eosinophil % 1.2 0.3 - 6.2 %    Basophil % 0.9 0.0 - 1.5 %    Neutrophils, Absolute 3.70 1.70 - 7.00 10*3/mm3    Lymphocytes, Absolute 1.50 0.70 - 3.10 10*3/mm3    Monocytes, Absolute 0.50 0.10 - 0.90 10*3/mm3    Eosinophils, Absolute 0.10 0.00 - 0.40 10*3/mm3    Basophils, Absolute 0.10 0.00 - 0.20 10*3/mm3    nRBC 0.2 0.0 - 0.2 /100 WBC   ECG 12 Lead Syncope   Result Value Ref Range    QT Interval 372 ms     XR Chest 2 View    Result Date:  12/13/2022  No active cardiopulmonary disease  Electronically Signed By-Davi Alberts On:12/13/2022 2:37 PM This report was finalized on [id number] by  Kavon Pedraza    XR Foot 3+ View Right    Result Date: 12/13/2022  Negative for fracture  Electronically Signed By-Davi Alberts On:12/13/2022 2:38 PM This report was finalized on [id number] by  Kavon Pedraza    CT Head Without Contrast    Result Date: 12/13/2022  Negative  Electronically Signed By-Davi Alberts On:12/13/2022 3:14 PM This report was finalized on [id number] by  Kavon Pedraza    Medications   lactated ringers bolus 1,000 mL (0 mL Intravenous Stopped 12/13/22 1619)                                  MDM  Number of Diagnoses or Management Options  Syncope, unspecified syncope type: new and requires workup  Diagnosis management comments: Medical decision making.  Patient IV established was given a liter lactated Ringer's and had the above exam evaluation EKG obtained reviewed by me was unremarkable.  Chest x-ray obtained reviewed by me as well as radiology unremarkable x-ray of the foot obtained reviewed by me and radiology unremarkable x-ray CT scan of the head without contrast no acute findings on my review as well as radiology.  Labs obtained reviewed by me COVID-19 flu negative chemistries unremarkable troponin negative CK normal magnesium normal.  TSH normal CBC normal the patient on repeat exam is resting comfortably she has had no dysrhythmia or further syncopal episodes there is been no evidence of a stroke or seizure on exam no evidence of acute myocardial infarction DVT pulmonary embolism rate or dissection.  We talked about the findings most likely related to vasovagal episode and of some mild dehydration after the vomiting and diarrhea.  I see no symptoms of a stroke she walks on difficulty no evidence suggest posterior circulation problem.  This is not a complete list of all possibilities we talked about overnight  admission to hospital for further work-up and evaluation but patient wants to go home.  I talked about repeat x-ray for occult fractures within the week and she voiced understanding we talked about when to return for she will follow with her primary care doctor and was discharged home for outpatient management follow-up stable unremarkable ER course       Amount and/or Complexity of Data Reviewed  Clinical lab tests: reviewed  Tests in the radiology section of CPT®: reviewed  Tests in the medicine section of CPT®: reviewed    Risk of Complications, Morbidity, and/or Mortality  Presenting problems: high  Diagnostic procedures: high  Management options: high    Patient Progress  Patient progress: stable      Final diagnoses:   Syncope, unspecified syncope type       ED Disposition  ED Disposition     ED Disposition   Discharge    Condition   Stable    Comment   --             Estela Lopez, APRN  [address] [place] [address] [phone]    In 1 day           Medication List      No changes were made to your prescriptions during this visit.          Go Dela Cruz MD  12/13/22 3081

## 2022-12-13 NOTE — DISCHARGE INSTRUCTIONS
Rest plenty fluids.  Follow-up with your primary care doctor tomorrow.  Return for reoccurrence of symptoms fevers speech difficulty paralysis to one-sided the other unable to eat drink talk walk or function normally chest pain neck arm jaw pain shortness of breath or any other new or worse problems or concerns.  Repeat x-ray on the foot if no improvement in 7 to 10 days.

## 2022-12-17 LAB — QT INTERVAL: 372 MS

## 2023-02-05 DIAGNOSIS — M70.62 TROCHANTERIC BURSITIS OF BOTH HIPS: ICD-10-CM

## 2023-02-05 DIAGNOSIS — M70.61 TROCHANTERIC BURSITIS OF BOTH HIPS: ICD-10-CM

## 2023-02-06 RX ORDER — DICLOFENAC SODIUM 75 MG/1
TABLET, DELAYED RELEASE ORAL
Qty: 60 TABLET | Refills: 3 | Status: SHIPPED | OUTPATIENT
Start: 2023-02-06

## 2023-04-19 ENCOUNTER — OFFICE VISIT (OUTPATIENT)
Dept: FAMILY MEDICINE CLINIC | Facility: CLINIC | Age: 58
End: 2023-04-19
Payer: COMMERCIAL

## 2023-04-19 ENCOUNTER — TRANSCRIBE ORDERS (OUTPATIENT)
Dept: FAMILY MEDICINE CLINIC | Facility: CLINIC | Age: 58
End: 2023-04-19
Payer: COMMERCIAL

## 2023-04-19 VITALS
SYSTOLIC BLOOD PRESSURE: 120 MMHG | BODY MASS INDEX: 27.94 KG/M2 | HEIGHT: 67 IN | WEIGHT: 178 LBS | TEMPERATURE: 97.2 F | HEART RATE: 88 BPM | OXYGEN SATURATION: 98 % | DIASTOLIC BLOOD PRESSURE: 74 MMHG

## 2023-04-19 DIAGNOSIS — F33.1 DEPRESSION, MAJOR, RECURRENT, MODERATE: Primary | ICD-10-CM

## 2023-04-19 DIAGNOSIS — R53.83 FATIGUE, UNSPECIFIED TYPE: ICD-10-CM

## 2023-04-19 DIAGNOSIS — F41.8 DEPRESSION WITH ANXIETY: ICD-10-CM

## 2023-04-19 DIAGNOSIS — Z12.31 ENCOUNTER FOR SCREENING MAMMOGRAM FOR MALIGNANT NEOPLASM OF BREAST: ICD-10-CM

## 2023-04-19 DIAGNOSIS — Z87.898 HISTORY OF SYNCOPE: ICD-10-CM

## 2023-04-19 DIAGNOSIS — R07.89 ATYPICAL CHEST PAIN: ICD-10-CM

## 2023-04-19 DIAGNOSIS — Z11.59 ENCOUNTER FOR HEPATITIS C SCREENING TEST FOR LOW RISK PATIENT: ICD-10-CM

## 2023-04-19 DIAGNOSIS — F33.1 DEPRESSION, MAJOR, RECURRENT, MODERATE: ICD-10-CM

## 2023-04-19 DIAGNOSIS — Z12.11 SCREEN FOR COLON CANCER: ICD-10-CM

## 2023-04-19 DIAGNOSIS — K21.9 GASTROESOPHAGEAL REFLUX DISEASE, UNSPECIFIED WHETHER ESOPHAGITIS PRESENT: Primary | ICD-10-CM

## 2023-04-19 LAB
25(OH)D3 SERPL-MCNC: 26.3 NG/ML (ref 30–100)
ALBUMIN SERPL-MCNC: 4.5 G/DL (ref 3.5–5.2)
ALBUMIN/GLOB SERPL: 1.7 G/DL
ALP SERPL-CCNC: 87 U/L (ref 39–117)
ALT SERPL W P-5'-P-CCNC: 9 U/L (ref 1–33)
ANION GAP SERPL CALCULATED.3IONS-SCNC: 9 MMOL/L (ref 5–15)
AST SERPL-CCNC: 13 U/L (ref 1–32)
BASOPHILS # BLD AUTO: 0.05 10*3/MM3 (ref 0–0.2)
BASOPHILS NFR BLD AUTO: 1 % (ref 0–1.5)
BILIRUB SERPL-MCNC: 0.3 MG/DL (ref 0–1.2)
BUN SERPL-MCNC: 12 MG/DL (ref 6–20)
BUN/CREAT SERPL: 13.8 (ref 7–25)
CALCIUM SPEC-SCNC: 9.5 MG/DL (ref 8.6–10.5)
CHLORIDE SERPL-SCNC: 103 MMOL/L (ref 98–107)
CHOLEST SERPL-MCNC: 247 MG/DL (ref 0–200)
CO2 SERPL-SCNC: 26 MMOL/L (ref 22–29)
CREAT SERPL-MCNC: 0.87 MG/DL (ref 0.57–1)
DEPRECATED RDW RBC AUTO: 40.2 FL (ref 37–54)
EGFRCR SERPLBLD CKD-EPI 2021: 77.3 ML/MIN/1.73
EOSINOPHIL # BLD AUTO: 0.09 10*3/MM3 (ref 0–0.4)
EOSINOPHIL NFR BLD AUTO: 1.9 % (ref 0.3–6.2)
ERYTHROCYTE [DISTWIDTH] IN BLOOD BY AUTOMATED COUNT: 11.6 % (ref 12.3–15.4)
GLOBULIN UR ELPH-MCNC: 2.6 GM/DL
GLUCOSE SERPL-MCNC: 100 MG/DL (ref 65–99)
HBA1C MFR BLD: 5.1 % (ref 4.8–5.6)
HCT VFR BLD AUTO: 41.6 % (ref 34–46.6)
HCV AB SER DONR QL: NORMAL
HDLC SERPL-MCNC: 61 MG/DL (ref 40–60)
HGB BLD-MCNC: 14.3 G/DL (ref 12–15.9)
IMM GRANULOCYTES # BLD AUTO: 0.01 10*3/MM3 (ref 0–0.05)
IMM GRANULOCYTES NFR BLD AUTO: 0.2 % (ref 0–0.5)
LDLC SERPL CALC-MCNC: 160 MG/DL (ref 0–100)
LDLC/HDLC SERPL: 2.57 {RATIO}
LYMPHOCYTES # BLD AUTO: 1.68 10*3/MM3 (ref 0.7–3.1)
LYMPHOCYTES NFR BLD AUTO: 34.9 % (ref 19.6–45.3)
MCH RBC QN AUTO: 33.3 PG (ref 26.6–33)
MCHC RBC AUTO-ENTMCNC: 34.4 G/DL (ref 31.5–35.7)
MCV RBC AUTO: 97 FL (ref 79–97)
MONOCYTES # BLD AUTO: 0.29 10*3/MM3 (ref 0.1–0.9)
MONOCYTES NFR BLD AUTO: 6 % (ref 5–12)
NEUTROPHILS NFR BLD AUTO: 2.7 10*3/MM3 (ref 1.7–7)
NEUTROPHILS NFR BLD AUTO: 56 % (ref 42.7–76)
NRBC BLD AUTO-RTO: 0 /100 WBC (ref 0–0.2)
PLATELET # BLD AUTO: 211 10*3/MM3 (ref 140–450)
PMV BLD AUTO: 11.2 FL (ref 6–12)
POTASSIUM SERPL-SCNC: 4.7 MMOL/L (ref 3.5–5.2)
PROT SERPL-MCNC: 7.1 G/DL (ref 6–8.5)
RBC # BLD AUTO: 4.29 10*6/MM3 (ref 3.77–5.28)
SODIUM SERPL-SCNC: 138 MMOL/L (ref 136–145)
T4 SERPL-MCNC: 5.18 MCG/DL (ref 4.5–11.7)
TRIGL SERPL-MCNC: 147 MG/DL (ref 0–150)
TSH SERPL DL<=0.05 MIU/L-ACNC: 0.87 UIU/ML (ref 0.27–4.2)
VIT B12 BLD-MCNC: 230 PG/ML (ref 211–946)
VLDLC SERPL-MCNC: 26 MG/DL (ref 5–40)
WBC NRBC COR # BLD: 4.82 10*3/MM3 (ref 3.4–10.8)

## 2023-04-19 PROCEDURE — 83036 HEMOGLOBIN GLYCOSYLATED A1C: CPT | Performed by: NURSE PRACTITIONER

## 2023-04-19 PROCEDURE — 80050 GENERAL HEALTH PANEL: CPT | Performed by: NURSE PRACTITIONER

## 2023-04-19 PROCEDURE — 86803 HEPATITIS C AB TEST: CPT | Performed by: NURSE PRACTITIONER

## 2023-04-19 PROCEDURE — 80061 LIPID PANEL: CPT | Performed by: NURSE PRACTITIONER

## 2023-04-19 PROCEDURE — 82306 VITAMIN D 25 HYDROXY: CPT | Performed by: NURSE PRACTITIONER

## 2023-04-19 PROCEDURE — 84436 ASSAY OF TOTAL THYROXINE: CPT | Performed by: NURSE PRACTITIONER

## 2023-04-19 PROCEDURE — 82607 VITAMIN B-12: CPT | Performed by: NURSE PRACTITIONER

## 2023-04-19 RX ORDER — OMEPRAZOLE 40 MG/1
40 CAPSULE, DELAYED RELEASE ORAL DAILY
Qty: 30 CAPSULE | Refills: 5 | Status: SHIPPED | OUTPATIENT
Start: 2023-04-19

## 2023-04-19 RX ORDER — HYDROXYZINE PAMOATE 25 MG/1
25 CAPSULE ORAL 2 TIMES DAILY PRN
COMMUNITY

## 2023-04-19 NOTE — PROGRESS NOTES
..  Venipuncture Blood Specimen Collection  Venipuncture performed in LEFT ARM by Jocelynn Banks  with good hemostasis. Patient tolerated the procedure well without complications.   04/19/23   Jocelynn Banks

## 2023-04-19 NOTE — PROGRESS NOTES
Chief Complaint  Labs Only (Patient has not been here in a while and she went ER in Dec 2022 for passing out.  She states that she had some abnormal lab work and she would like to discuss it.  Plus she has a lab order from [facility] to be done today also. )    Subjective            Deb Rayo presents to [facility]  History of Present Illness  Patient is here today for follow-up from when she was in the emergency room in December 2022 for a syncopal episode and she reported to the ER department that she lives alone and that she completely fainted and woke up shaking--she said they worked her up cardiac-wise and everything was normal/negative and they also did a CT of the brain--and at present time no further syncopal episodes and she does not want to proceed with any further work-up at this time    Then she also reports that she thought she saw where she had prior labs done for her higher with [facility] in 2021 where she was positive for hep C and no one contacted her with regards to this--I did review all of her labs with her one by one and showed the patient that actually it was a hepatitis B surface antibody which means she actually has immunity against hepatitis B but that she does not have hepatitis B or C based on the labs that I have access to but we will go ahead and proceed with the screening for hepatitis C today    And then also with regards to her major depression: Patient is under care at [facility] and is seeing a mental health provider/therapist and they are prescribing medications and attempting to get her depression under control and she does have labs that need to be done today and she brought in the orders--no actionable suicidal plan but she does have the ideations at times and even within the last 2 weeks and her mental health provider is aware and patient denies having any actual plan to do harm to herself or others      PHQ-2 Total Score:  18  PHQ-9 Total Score: 18       PHQ-2/PHQ-9: Depression Screening     Little Interest or Pleasure in Doing Things 2-->more than half the days   Feeling Down, Depressed or Hopeless 3-->nearly every day   PHQ-2 Total Score 5   Trouble Falling or Staying Asleep, or Sleeping Too Much 2-->more than half the days   Feeling Tired or Having Little Energy 2-->more than half the days   Poor Appetite or Overeating 2-->more than half the days   Feeling Bad about Yourself - or that You are a Failure or Have Let Yourself or Your Family Down 2-->more than half the days   Trouble Concentrating on Things, Such as Reading the Newspaper or Watching Television 2-->more than half the days   Moving or Speaking So Slowly that Other People Could Have Noticed? Or the Opposite - Being So Fidgety 1-->several days   Thoughts that You Would be Better Off Dead or of Hurting Yourself in Some Way 2-->more than half the days   PHQ-9: Brief Depression Severity Measure Score 18   If You Checked Off Any Problems, How Difficult Have These Problems Made It For You to Do Your Work, Take Care of Things at Home, or Get Along with Other People? very difficult   C-SSRS (Screen-Recent) Past Month     Q1 Wished to be Dead (Past Month) yes   Q2 Suicidal Thoughts (Past Month) yes   Q3 Suicidal Thought Method yes   Q4 Suicidal Intent without Specific Plan no   Q5 Suicide Intent with Specific Plan no   Q6 Suicide Behavior (Lifetime) yes   Within the past 3 Months? no   Level of Risk per Screen          History reviewed. No pertinent past medical history.    Allergies   Allergen Reactions   • Morphine Itching        History reviewed. No pertinent surgical history.     Social History     Tobacco Use   • Smoking status: Every Day     Packs/day: 1.00     Years: 30.00     Pack years: 30.00     Types: Cigarettes     Passive exposure: Past   • Smokeless tobacco: Never   Vaping Use   • Vaping Use: Never used   Substance Use Topics   • Alcohol use: Not Currently   • Drug  use: Defer       Family History   Problem Relation Age of Onset   • Lung cancer Mother    • Heart disease Father    • No Known Problems Brother         Health Maintenance Due   Topic Date Due   • MAMMOGRAM  Never done   • COLORECTAL CANCER SCREENING  Never done   • Pneumococcal Vaccine 0-64 (1 - PCV) Never done   • ZOSTER VACCINE (1 of 2) Never done   • LUNG CANCER SCREENING  Never done   • COVID-19 Vaccine (5 - Booster) 10/20/2021   • HEPATITIS C SCREENING  Never done   • ANNUAL PHYSICAL  Never done   • PAP SMEAR  Never done        Current Outpatient Medications on File Prior to Visit   Medication Sig   • buPROPion XL (WELLBUTRIN XL) 300 MG 24 hr tablet Take 1 tablet by mouth Every Morning.   • diclofenac (VOLTAREN) 75 MG EC tablet TAKE 1 TABLET BY MOUTH TWICE DAILY   • hydrOXYzine pamoate (VISTARIL) 25 MG capsule Take 1 capsule by mouth 2 (Two) Times a Day As Needed. 1-2 tabs BID PRN   • OXcarbazepine (TRILEPTAL) 600 MG tablet Take 600 mg by mouth 2 (Two) Times a Day.   • tiZANidine (Zanaflex) 4 MG tablet Take 1 tablet by mouth At Night As Needed for Muscle Spasms.   • traZODone (DESYREL) 100 MG tablet Take 1 tablet by mouth every night at bedtime.   • [DISCONTINUED] hydrOXYzine pamoate (VISTARIL) 25 MG capsule Take 1-2 tabs 20-30 min prior to having MRI (Patient not taking: Reported on 4/19/2023)   • [DISCONTINUED] methylPREDNISolone (MEDROL) 4 MG dose pack Take as directed on package instructions. (Patient not taking: Reported on 4/19/2023)     No current facility-administered medications on file prior to visit.       Immunization History   Administered Date(s) Administered   • COVID-19 (MODERNA) 1st, 2nd, 3rd Dose Only 07/28/2021, 08/25/2021   • COVID-19 (UNSPECIFIED) 07/28/2021, 08/25/2021   • Flu Vaccine Quad PF >36MO 11/04/2021   • Tdap 03/13/2019       Review of Systems   Constitutional: Positive for fatigue.   HENT: Negative for trouble swallowing.    Respiratory: Negative for choking and shortness of  "breath.    Cardiovascular: Positive for chest pain.        In the past 3 months awakens sometimes with sternal pain and then will take zantac and then has to sit up and then after maybe 1 hr finally resolves    Gastrointestinal: Positive for diarrhea and GERD. Negative for abdominal distention, abdominal pain, blood in stool, constipation, nausea and vomiting.        Pt reports a lot of bloating and some diarrhea--Hx of IBS    Genitourinary: Negative for breast discharge, breast lump, breast pain, dysuria and vaginal bleeding.   Neurological: Positive for dizziness and light-headedness. Negative for syncope.        In Dec 2022 pt was eval'd for syncope    Psychiatric/Behavioral: Positive for suicidal ideas and depressed mood. Negative for self-injury.        Still struggles with suicidal thoughts at times no actual plan to do harm         Objective     /74 (BP Location: Left arm, Patient Position: Sitting, Cuff Size: Adult)   Pulse 88   Temp 97.2 °F (36.2 °C) (Temporal)   Ht 170.2 cm (67\")   Wt 80.7 kg (178 lb)   SpO2 98%   BMI 27.88 kg/m²       Physical Exam  Vitals and nursing note reviewed.   Constitutional:       Appearance: Normal appearance.   HENT:      Head: Normocephalic.      Right Ear: External ear normal.      Left Ear: External ear normal.      Nose: Nose normal.      Mouth/Throat:      Mouth: Mucous membranes are moist.   Eyes:      Pupils: Pupils are equal, round, and reactive to light.   Cardiovascular:      Rate and Rhythm: Normal rate and regular rhythm.      Heart sounds: Normal heart sounds.   Pulmonary:      Effort: Pulmonary effort is normal.      Breath sounds: Normal breath sounds.   Abdominal:      Palpations: Abdomen is soft.   Musculoskeletal:         General: Normal range of motion.      Cervical back: Normal range of motion.   Skin:     General: Skin is warm and dry.   Neurological:      Mental Status: She is alert and oriented to person, place, and time.   Psychiatric:       "   Mood and Affect: Mood normal.         Behavior: Behavior normal.         Thought Content: Thought content normal.         Judgment: Judgment normal.         Result Review :           ED with Go Dela Cruz MD (12/13/2022)  CBC AND DIFFERENTIAL (01/11/2019 09:10)  BASIC METABOLIC PANEL (01/11/2019 09:10)  NEW EMPLOYEE LABS (12/09/2021 11:19)  QuantiFERON-TB Gold Plus (12/09/2021 11:19)    CBC & Differential (12/13/2022 14:35)  Comprehensive Metabolic Panel (12/13/2022 14:35)  Lipase (12/13/2022 14:35)  Troponin (12/13/2022 14:35)  CK (12/13/2022 14:35)  Magnesium (12/13/2022 14:35)  TSH (12/13/2022 14:35)  COVID-19 and FLU A/B PCR - Swab, Nasopharynx (12/13/2022 14:35)  ECG 12 Lead Syncope (12/13/2022 13:29)  CT Head Without Contrast (12/13/2022 14:51)               Assessment and Plan      Diagnoses and all orders for this visit:    1. Gastroesophageal reflux disease, unspecified whether esophagitis present (Primary)  -     omeprazole (priLOSEC) 40 MG capsule; Take 1 capsule by mouth Daily.  Dispense: 30 capsule; Refill: 5    2. Encounter for hepatitis C screening test for low risk patient  -     Hepatitis C Antibody    3. History of syncope    4. Atypical chest pain  -     omeprazole (priLOSEC) 40 MG capsule; Take 1 capsule by mouth Daily.  Dispense: 30 capsule; Refill: 5    5. Fatigue, unspecified type  -     Hepatitis C Antibody    6. Encounter for screening mammogram for malignant neoplasm of breast  -     Mammo Screening Digital Tomosynthesis Bilateral With CAD    7. Screen for colon cancer  -     Cologuard - Stool, Per Rectum; Future    8. Depression, major, recurrent, moderate  -     CBC w AUTO Differential  -     Comprehensive metabolic panel  -     TSH  -     T4  -     Hemoglobin A1c  -     Lipid panel  -     Vitamin B12  -     Cancel: CBC w AUTO Differential  -     Vitamin D 25 hydroxy    9. Depression with anxiety  -     CBC w AUTO Differential  -     Comprehensive metabolic panel  -     TSH  -      T4  -     Hemoglobin A1c  -     Lipid panel  -     Vitamin B12  -     Cancel: CBC w AUTO Differential  -     Vitamin D 25 hydroxy    was eval'd for syncope in her childhood and they did the EEG testing then and the episode in Dec 2022 they did the CT of brain and was good--and then did the cardiac workup--and pt will let me know should she have any further issues then allow me to refer to neurologist for eval--at present time patient declines any further referral or work-up    And we will also order the hepatitis C screening    All other labs are per the psychiatrist        Follow Up     Return if symptoms worsen or fail to improve.

## 2023-04-20 NOTE — PROGRESS NOTES
Please mail letter to patient and I will also do my chart message    Deb the hepatitis C antibody screening was completely negative

## 2023-05-12 ENCOUNTER — HOSPITAL ENCOUNTER (OUTPATIENT)
Dept: MAMMOGRAPHY | Facility: HOSPITAL | Age: 58
Discharge: HOME | End: 2023-05-12
Admitting: NURSE PRACTITIONER
Payer: COMMERCIAL

## 2023-05-12 PROCEDURE — 77067 SCR MAMMO BI INCL CAD: CPT

## 2023-05-12 PROCEDURE — 77063 BREAST TOMOSYNTHESIS BI: CPT

## 2023-05-16 DIAGNOSIS — R92.8 ABNORMAL MAMMOGRAM OF BOTH BREASTS: Primary | ICD-10-CM

## 2023-05-25 ENCOUNTER — HOSPITAL ENCOUNTER (OUTPATIENT)
Dept: ULTRASOUND IMAGING | Facility: HOSPITAL | Age: 58
Discharge: HOME | End: 2023-05-25
Payer: COMMERCIAL

## 2023-05-25 ENCOUNTER — TELEPHONE (OUTPATIENT)
Dept: FAMILY MEDICINE CLINIC | Facility: CLINIC | Age: 58
End: 2023-05-25
Payer: COMMERCIAL

## 2023-05-25 ENCOUNTER — HOSPITAL ENCOUNTER (OUTPATIENT)
Dept: MAMMOGRAPHY | Facility: HOSPITAL | Age: 58
Discharge: HOME | End: 2023-05-25
Payer: COMMERCIAL

## 2023-05-25 DIAGNOSIS — R92.8 ABNORMAL MAMMOGRAM OF BOTH BREASTS: ICD-10-CM

## 2023-05-25 DIAGNOSIS — R92.2 BREAST DENSITY: ICD-10-CM

## 2023-05-25 DIAGNOSIS — R92.8 ABNORMALITY OF LEFT BREAST ON SCREENING MAMMOGRAM: Primary | ICD-10-CM

## 2023-05-25 PROCEDURE — G0279 TOMOSYNTHESIS, MAMMO: HCPCS

## 2023-05-25 PROCEDURE — 76642 ULTRASOUND BREAST LIMITED: CPT

## 2023-05-25 PROCEDURE — 77066 DX MAMMO INCL CAD BI: CPT

## 2023-05-25 NOTE — TELEPHONE ENCOUNTER
Attempted to call patient twice and left messages her to call back with regards to the need to discuss the results of the diagnostic mammogram and ultrasound left breast right breast was benign the left breast needs ultrasound-guided core biopsy on an indeterminant area behind the areola/nipple area of the left breast and I will go ahead and place the orders but we will need to discuss this with her and I left a message that she could check out to our office tomorrow and even speak with the  as well thank you (also attempted the other contact number in the person said they have not seen her in 2 months so I said no more)

## 2023-06-05 ENCOUNTER — PATIENT OUTREACH (OUTPATIENT)
Dept: ONCOLOGY | Facility: HOSPITAL | Age: 58
End: 2023-06-05
Payer: COMMERCIAL

## 2023-06-05 ENCOUNTER — HOSPITAL ENCOUNTER (OUTPATIENT)
Dept: MAMMOGRAPHY | Facility: HOSPITAL | Age: 58
Discharge: HOME | End: 2023-06-05
Payer: COMMERCIAL

## 2023-06-05 DIAGNOSIS — R92.2 BREAST DENSITY: ICD-10-CM

## 2023-06-05 DIAGNOSIS — R92.8 ABNORMALITY OF LEFT BREAST ON SCREENING MAMMOGRAM: ICD-10-CM

## 2023-06-05 PROCEDURE — 0 LIDOCAINE 1 % SOLUTION: Performed by: NURSE PRACTITIONER

## 2023-06-05 PROCEDURE — 88305 TISSUE EXAM BY PATHOLOGIST: CPT | Performed by: NURSE PRACTITIONER

## 2023-06-05 PROCEDURE — A4648 IMPLANTABLE TISSUE MARKER: HCPCS

## 2023-06-05 RX ORDER — LIDOCAINE HYDROCHLORIDE AND EPINEPHRINE 10; 10 MG/ML; UG/ML
10 INJECTION, SOLUTION INFILTRATION; PERINEURAL ONCE
Status: COMPLETED | OUTPATIENT
Start: 2023-06-05 | End: 2023-06-05

## 2023-06-05 RX ORDER — LIDOCAINE HYDROCHLORIDE 10 MG/ML
20 INJECTION, SOLUTION INFILTRATION; PERINEURAL ONCE
Status: COMPLETED | OUTPATIENT
Start: 2023-06-05 | End: 2023-06-05

## 2023-06-05 RX ADMIN — LIDOCAINE HYDROCHLORIDE 20 ML: 10 INJECTION, SOLUTION INFILTRATION; PERINEURAL at 11:01

## 2023-06-05 RX ADMIN — LIDOCAINE HYDROCHLORIDE,EPINEPHRINE BITARTRATE 10 ML: 10; .01 INJECTION, SOLUTION INFILTRATION; PERINEURAL at 11:01

## 2023-06-06 LAB
CYTO UR: NORMAL
LAB AP CASE REPORT: NORMAL
LAB AP CLINICAL INFORMATION: NORMAL
PATH REPORT.FINAL DX SPEC: NORMAL
PATH REPORT.GROSS SPEC: NORMAL

## 2023-06-07 NOTE — PROGRESS NOTES
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

## 2023-06-07 NOTE — PROGRESS NOTES
Called and spoke with patient and relayed the following:  Pathology result is available and shows the following:  Fibroadenoma.    This benign result is concordant with imaging findings.  Recommend annual screening mammography.

## 2023-06-08 ENCOUNTER — PATIENT OUTREACH (OUTPATIENT)
Dept: ONCOLOGY | Facility: HOSPITAL | Age: 58
End: 2023-06-08
Payer: COMMERCIAL

## 2023-06-18 DIAGNOSIS — M70.61 TROCHANTERIC BURSITIS OF BOTH HIPS: ICD-10-CM

## 2023-06-18 DIAGNOSIS — M70.62 TROCHANTERIC BURSITIS OF BOTH HIPS: ICD-10-CM

## 2023-06-19 RX ORDER — DICLOFENAC SODIUM 75 MG/1
TABLET, DELAYED RELEASE ORAL
Qty: 60 TABLET | Refills: 3 | Status: SHIPPED | OUTPATIENT
Start: 2023-06-19

## 2023-09-27 ENCOUNTER — OFFICE VISIT (OUTPATIENT)
Dept: FAMILY MEDICINE CLINIC | Facility: CLINIC | Age: 58
End: 2023-09-27
Payer: COMMERCIAL

## 2023-09-27 VITALS
HEIGHT: 67 IN | DIASTOLIC BLOOD PRESSURE: 80 MMHG | HEART RATE: 86 BPM | OXYGEN SATURATION: 96 % | TEMPERATURE: 98.7 F | SYSTOLIC BLOOD PRESSURE: 122 MMHG | BODY MASS INDEX: 29.19 KG/M2 | WEIGHT: 186 LBS

## 2023-09-27 DIAGNOSIS — Z85.820 HISTORY OF MELANOMA: ICD-10-CM

## 2023-09-27 DIAGNOSIS — J34.89 LESION OF NOSE: ICD-10-CM

## 2023-09-27 DIAGNOSIS — L98.9 FACIAL LESION: ICD-10-CM

## 2023-09-27 DIAGNOSIS — Z85.828 HISTORY OF SKIN CANCER: ICD-10-CM

## 2023-09-27 DIAGNOSIS — L98.9 CHEST SKIN LESION: Primary | ICD-10-CM

## 2023-09-27 PROCEDURE — 99213 OFFICE O/P EST LOW 20 MIN: CPT | Performed by: NURSE PRACTITIONER

## 2023-09-27 NOTE — PROGRESS NOTES
Chief Complaint  Cyst (Spot on lower right arm and one on right breast and one on her nose.)    Subjective            Deb Rayo presents to [facility]  History of Present Illness  Pt has the Hx of skin cancer-and melanoma -    and reports having 3 new places--1 on the right FA --and right inner chest and to the right side of the nose--        PHQ-2 Total Score:    PHQ-9 Total Score:      History reviewed. No pertinent past medical history.    Allergies   Allergen Reactions    Morphine Itching        Past Surgical History:   Procedure Laterality Date    BREAST BIOPSY Left         Social History     Tobacco Use    Smoking status: Every Day     Packs/day: 1.00     Years: 30.00     Pack years: 30.00     Types: Cigarettes     Start date: 1984     Passive exposure: Past    Smokeless tobacco: Never   Vaping Use    Vaping Use: Never used   Substance Use Topics    Alcohol use: Not Currently    Drug use: Defer       Family History   Problem Relation Age of Onset    Ovarian cancer Mother     Lung cancer Mother     Heart disease Father     No Known Problems Brother     Breast cancer Maternal Aunt     Breast cancer Maternal Aunt         Health Maintenance Due   Topic Date Due    BMI FOLLOWUP  Never done    COLORECTAL CANCER SCREENING  Never done    Pneumococcal Vaccine 0-64 (1 - PCV) Never done    ZOSTER VACCINE (1 of 2) Never done    LUNG CANCER SCREENING  Never done    COVID-19 Vaccine (5 - Mixed Product series) 10/20/2021    ANNUAL PHYSICAL  Never done    PAP SMEAR  Never done        Current Outpatient Medications on File Prior to Visit   Medication Sig    buPROPion XL (WELLBUTRIN XL) 300 MG 24 hr tablet Take 1 tablet by mouth Every Morning.    diclofenac (VOLTAREN) 75 MG EC tablet TAKE 1 TABLET BY MOUTH TWICE DAILY    hydrOXYzine pamoate (VISTARIL) 25 MG capsule Take 1 capsule by mouth 2 (Two) Times a Day As Needed. 1-2 tabs BID PRN    omeprazole (priLOSEC) 40 MG capsule Take 1 capsule  "by mouth Daily.    tiZANidine (Zanaflex) 4 MG tablet Take 1 tablet by mouth At Night As Needed for Muscle Spasms.    traZODone (DESYREL) 100 MG tablet Take 1 tablet by mouth every night at bedtime.    [DISCONTINUED] OXcarbazepine (TRILEPTAL) 600 MG tablet Take 600 mg by mouth 2 (Two) Times a Day.     No current facility-administered medications on file prior to visit.       Immunization History   Administered Date(s) Administered    COVID-19 (MODERNA) 1st,2nd,3rd Dose Monovalent 07/28/2021, 08/25/2021    COVID-19 (UNSPECIFIED) 07/28/2021, 08/25/2021    Flu Vaccine Quad PF >36MO 11/04/2021    Fluzone (or Fluarix & Flulaval for VFC) >6mos 11/04/2021, 11/01/2022    Tdap 03/13/2019       Review of Systems   Skin:  Positive for skin lesions.        As per HPI       Objective     /80 (BP Location: Left arm)   Pulse 86   Temp 98.7 °F (37.1 °C)   Ht 170.2 cm (67\")   Wt 84.4 kg (186 lb)   SpO2 96%   BMI 29.13 kg/m²       Physical Exam  Vitals and nursing note reviewed.   Constitutional:       Appearance: Normal appearance.   HENT:      Head: Normocephalic.      Nose: Nose normal.        Mouth/Throat:      Mouth: Mucous membranes are moist.   Eyes:      Pupils: Pupils are equal, round, and reactive to light.   Cardiovascular:      Rate and Rhythm: Normal rate.   Pulmonary:      Effort: Pulmonary effort is normal.   Chest:          Comments: Chest wall lesion right inner breast approx 5-6 mm   Musculoskeletal:         General: Normal range of motion.        Arms:       Cervical back: Normal range of motion.      Comments: Approx 8-9 mm irregular bordered and > 2-3 toned color--lesion of right FA    Skin:     General: Skin is warm and dry.      Findings: Lesion present.   Neurological:      Mental Status: She is alert and oriented to person, place, and time.   Psychiatric:         Mood and Affect: Mood normal.         Behavior: Behavior normal.         Thought Content: Thought content normal.         Judgment: " Judgment normal.       Result Review :                      CLINICAL PHOTOGRAPHS OTHER - Right sided nasal lesion (09/27/2023)  CLINICAL PHOTOGRAPHS OTHER - Right inner breast/chest lesion (09/27/2023)  CLINICAL PHOTOGRAPHS OTHER - Right FA LESION (09/27/2023)     Assessment and Plan      Diagnoses and all orders for this visit:    1. Chest skin lesion (Primary)  -     Ambulatory Referral to Dermatology    2. Facial lesion  -     Ambulatory Referral to Dermatology    3. Lesion of nose  -     Ambulatory Referral to Dermatology    4. History of melanoma  -     Ambulatory Referral to Dermatology    5. History of skin cancer  -     Ambulatory Referral to Dermatology            Follow Up     Return if symptoms worsen or fail to improve.    Patient was given instructions and counseling regarding her condition or for health maintenance advice. Please see specific information pulled into the AVS if appropriate.

## 2023-10-28 DIAGNOSIS — M70.62 TROCHANTERIC BURSITIS OF BOTH HIPS: ICD-10-CM

## 2023-10-28 DIAGNOSIS — M70.61 TROCHANTERIC BURSITIS OF BOTH HIPS: ICD-10-CM

## 2023-10-30 RX ORDER — DICLOFENAC SODIUM 75 MG/1
TABLET, DELAYED RELEASE ORAL
Qty: 60 TABLET | Refills: 3 | Status: SHIPPED | OUTPATIENT
Start: 2023-10-30

## 2024-03-03 DIAGNOSIS — M70.61 TROCHANTERIC BURSITIS OF BOTH HIPS: ICD-10-CM

## 2024-03-03 DIAGNOSIS — M70.62 TROCHANTERIC BURSITIS OF BOTH HIPS: ICD-10-CM

## 2024-03-04 RX ORDER — DICLOFENAC SODIUM 75 MG/1
TABLET, DELAYED RELEASE ORAL
Qty: 60 TABLET | Refills: 3 | OUTPATIENT
Start: 2024-03-04

## 2024-03-12 DIAGNOSIS — M70.62 TROCHANTERIC BURSITIS OF BOTH HIPS: ICD-10-CM

## 2024-03-12 DIAGNOSIS — M70.61 TROCHANTERIC BURSITIS OF BOTH HIPS: ICD-10-CM

## 2024-03-12 RX ORDER — DICLOFENAC SODIUM 75 MG/1
TABLET, DELAYED RELEASE ORAL
Qty: 60 TABLET | Refills: 3 | Status: SHIPPED | OUTPATIENT
Start: 2024-03-12

## 2024-04-01 ENCOUNTER — TELEPHONE (OUTPATIENT)
Dept: FAMILY MEDICINE CLINIC | Facility: CLINIC | Age: 59
End: 2024-04-01

## 2024-04-01 DIAGNOSIS — B37.31 VAGINAL YEAST INFECTION: Primary | ICD-10-CM

## 2024-04-01 RX ORDER — FLUCONAZOLE 150 MG/1
150 TABLET ORAL ONCE
Qty: 1 TABLET | Refills: 0 | Status: SHIPPED | OUTPATIENT
Start: 2024-04-01 | End: 2024-04-01

## 2024-04-01 NOTE — TELEPHONE ENCOUNTER
Caller: Girma Deb    Relationship: Self    Best call back number: [phone]    What medication are you requesting: PILL FOR YEAST INFECTION    What are your current symptoms: N/A    How long have you been experiencing symptoms: N/A    Have you had these symptoms before:    [] Yes  [] No    Have you been treated for these symptoms before:   [] Yes  [] No    If a prescription is needed, what is your preferred pharmacy and phone number: [address] [phone] [facility] [phone]      Additional notes:  PATIENT FEELS CERTAIN SHE HAS A YEAST INFECTION. SHE NORMALLY TAKES THE LITTLE ROUND PILL PATIENT DOES NOT REMEMBER THE NAME OF THIS PILL. PLEASE SEND NEW PRESCRIPTION TO PHARMACY ASAP. PATIENT IS HAVING DISCOMFORT. IF THIS IS NOT POSSIBLE WITHOUT AN APPT., PLEASE CALL PATIENT AND LET HER KNOW.       X Size Of Lesion In Cm (Optional): 0 Consent: The risks of atrophy were reviewed with the patient. Detail Level: Detailed Total Volume Injected (Ccs- Only Use Numbers And Decimals): 1.0 Concentration Of Solution Injected (Mg/Ml): 10.0 Medical Necessity Clause: This procedure was medically necessary because the lesions that were treated were: Include Z78.9 (Other Specified Conditions Influencing Health Status) As An Associated Diagnosis?: No Kenalog Preparation: Kenalog

## 2024-07-13 DIAGNOSIS — M70.61 TROCHANTERIC BURSITIS OF BOTH HIPS: ICD-10-CM

## 2024-07-13 DIAGNOSIS — M70.62 TROCHANTERIC BURSITIS OF BOTH HIPS: ICD-10-CM

## 2024-07-15 RX ORDER — DICLOFENAC SODIUM 75 MG/1
TABLET, DELAYED RELEASE ORAL
Qty: 60 TABLET | Refills: 3 | OUTPATIENT
Start: 2024-07-15

## 2024-07-22 DIAGNOSIS — M70.61 TROCHANTERIC BURSITIS OF BOTH HIPS: ICD-10-CM

## 2024-07-22 DIAGNOSIS — M70.62 TROCHANTERIC BURSITIS OF BOTH HIPS: ICD-10-CM

## 2024-07-22 RX ORDER — DICLOFENAC SODIUM 75 MG/1
TABLET, DELAYED RELEASE ORAL
Qty: 60 TABLET | Refills: 3 | OUTPATIENT
Start: 2024-07-22

## 2024-08-30 RX ORDER — BUSPIRONE HYDROCHLORIDE 5 MG/1
7.5 TABLET ORAL 2 TIMES DAILY
Qty: 90 TABLET | Refills: 0 | Status: SHIPPED | OUTPATIENT
Start: 2024-08-30

## 2024-10-02 ENCOUNTER — TELEPHONE (OUTPATIENT)
Dept: FAMILY MEDICINE CLINIC | Facility: CLINIC | Age: 59
End: 2024-10-02
Payer: COMMERCIAL

## 2024-10-02 ENCOUNTER — TRANSCRIBE ORDERS (OUTPATIENT)
Dept: ADMINISTRATIVE | Facility: HOSPITAL | Age: 59
End: 2024-10-02
Payer: COMMERCIAL

## 2024-10-02 DIAGNOSIS — Z12.31 SCREENING MAMMOGRAM FOR BREAST CANCER: Primary | ICD-10-CM

## 2024-10-02 NOTE — TELEPHONE ENCOUNTER
Caller: Deb Rayo    Relationship: Self    Best call back number:     [phone]       What is the medical concern/diagnosis: MAMMOGRAM      What is the office location: [place]     PLEASE CONTACT PATIENT ONCE ORDERS ARE IN.

## 2024-10-30 ENCOUNTER — OFFICE VISIT (OUTPATIENT)
Dept: FAMILY MEDICINE CLINIC | Facility: CLINIC | Age: 59
End: 2024-10-30
Payer: COMMERCIAL

## 2024-10-30 VITALS
WEIGHT: 166 LBS | HEIGHT: 67 IN | DIASTOLIC BLOOD PRESSURE: 72 MMHG | HEART RATE: 78 BPM | OXYGEN SATURATION: 98 % | TEMPERATURE: 96.7 F | BODY MASS INDEX: 26.06 KG/M2 | SYSTOLIC BLOOD PRESSURE: 126 MMHG

## 2024-10-30 DIAGNOSIS — Z12.2 ENCOUNTER FOR SCREENING FOR LUNG CANCER: ICD-10-CM

## 2024-10-30 DIAGNOSIS — M51.362 DEGENERATION OF INTERVERTEBRAL DISC OF LUMBAR REGION WITH DISCOGENIC BACK PAIN AND LOWER EXTREMITY PAIN: ICD-10-CM

## 2024-10-30 DIAGNOSIS — Z23 NEED FOR INFLUENZA VACCINATION: ICD-10-CM

## 2024-10-30 DIAGNOSIS — Z12.11 SCREENING FOR COLON CANCER: ICD-10-CM

## 2024-10-30 DIAGNOSIS — Z12.11 SCREEN FOR COLON CANCER: ICD-10-CM

## 2024-10-30 DIAGNOSIS — R55 SYNCOPE, UNSPECIFIED SYNCOPE TYPE: ICD-10-CM

## 2024-10-30 DIAGNOSIS — R10.2 LEFT ADNEXAL TENDERNESS: ICD-10-CM

## 2024-10-30 DIAGNOSIS — Z01.419 WOMEN'S ANNUAL ROUTINE GYNECOLOGICAL EXAMINATION: Primary | ICD-10-CM

## 2024-10-30 DIAGNOSIS — R41.89 BRAIN FOG: ICD-10-CM

## 2024-10-30 DIAGNOSIS — R31.21 ASYMPTOMATIC MICROSCOPIC HEMATURIA: ICD-10-CM

## 2024-10-30 DIAGNOSIS — Z80.41 FAMILY HISTORY OF OVARIAN CANCER: ICD-10-CM

## 2024-10-30 DIAGNOSIS — Z90.711 S/P PARTIAL HYSTERECTOMY: ICD-10-CM

## 2024-10-30 LAB
BILIRUB BLD-MCNC: NEGATIVE MG/DL
C TRACH RRNA CVX QL NAA+PROBE: NOT DETECTED
CLARITY, POC: CLEAR
COLOR UR: ABNORMAL
EXPIRATION DATE: ABNORMAL
GLUCOSE UR STRIP-MCNC: NEGATIVE MG/DL
HAV IGM SERPL QL IA: NORMAL
HBV CORE IGM SERPL QL IA: NORMAL
HBV SURFACE AG SERPL QL IA: NORMAL
HCV AB SER QL: NORMAL
HIV 1+2 AB+HIV1 P24 AG SERPL QL IA: NORMAL
KETONES UR QL: NEGATIVE
LEUKOCYTE EST, POC: NEGATIVE
Lab: ABNORMAL
N GONORRHOEA RRNA SPEC QL NAA+PROBE: NOT DETECTED
NITRITE UR-MCNC: NEGATIVE MG/ML
PH UR: 5.5 [PH] (ref 5–8)
PROT UR STRIP-MCNC: NEGATIVE MG/DL
RBC # UR STRIP: ABNORMAL /UL
RPR SER QL: NORMAL
SP GR UR: 1.02 (ref 1–1.03)
UROBILINOGEN UR QL: ABNORMAL

## 2024-10-30 PROCEDURE — 87591 N.GONORRHOEAE DNA AMP PROB: CPT | Performed by: NURSE PRACTITIONER

## 2024-10-30 PROCEDURE — 80074 ACUTE HEPATITIS PANEL: CPT | Performed by: NURSE PRACTITIONER

## 2024-10-30 PROCEDURE — G0432 EIA HIV-1/HIV-2 SCREEN: HCPCS | Performed by: NURSE PRACTITIONER

## 2024-10-30 PROCEDURE — 86696 HERPES SIMPLEX TYPE 2 TEST: CPT | Performed by: NURSE PRACTITIONER

## 2024-10-30 PROCEDURE — G0123 SCREEN CERV/VAG THIN LAYER: HCPCS | Performed by: NURSE PRACTITIONER

## 2024-10-30 PROCEDURE — 86695 HERPES SIMPLEX TYPE 1 TEST: CPT | Performed by: NURSE PRACTITIONER

## 2024-10-30 PROCEDURE — 86592 SYPHILIS TEST NON-TREP QUAL: CPT | Performed by: NURSE PRACTITIONER

## 2024-10-30 PROCEDURE — 87624 HPV HI-RISK TYP POOLED RSLT: CPT | Performed by: NURSE PRACTITIONER

## 2024-10-30 PROCEDURE — 87491 CHLMYD TRACH DNA AMP PROBE: CPT | Performed by: NURSE PRACTITIONER

## 2024-10-30 PROCEDURE — 87086 URINE CULTURE/COLONY COUNT: CPT | Performed by: NURSE PRACTITIONER

## 2024-10-30 NOTE — PROGRESS NOTES
"Chief Complaint  Gynecologic Exam    Subjective            Deb Rayo presents to [facility]  History of Present Illness  Pt is here for the wellness exam and  woman exam pap smear --and last had a pap and partial hysterectomy --approx 20 yrs ago--was R/T a prolapsed uterus and then reports mother was Dx'd with ovarian cancer at age 60 yrs old --and pt was concerned--and just wanted everything checked closely and then also would like the STD screening as well--    She has her mammo already scheduled coming up soon    _____________________________________    Also mentioned that she has had chronic low back pain that has been persistent and she even had x-rays few years back and has progressively continued to worsen over the past 3 years and she even did a whole regimen of physical therapy here with our physical therapy department and then it still persists and she even reached out and called the neurosurgeons office and may need referral and I did explain to her that they usually need an MRI as well-but she said it now is so bad that when she stands for very long at all it is severe and it radiates to both legs extreme pain does not report any saddle paresthesias or loss of control of bowels or bladder    _____________________________________    And then also patient reports that additionally that she also suffers with syncope and syncopal episodes and has had this on and off since childhood or early teen years and she even was in the emergency room for this 2 years ago and they were worked up her heart and they did a CT of the head and everything was negative but she is never followed up regarding this and she had a recent episode that last week on a Monday and reports that for the rest of the week she felt like she was in a brain fog and now that is lifted but it still very concerning to her that she keeps having the syncopal episodes and she says that \"no one can figure this " "out\" but she has never been seen by neurology either or an actual cardiologist other than being evaluated in the emergency room--denies all chest pain shortness of breath dizziness lightheadedness severe headaches palpitations etc.    Was seeing DR Valencia for ortho    Then also sees mental health provider for other medications           PHQ-2 Total Score: 0  PHQ-9 Total Score:      History reviewed. No pertinent past medical history.    Allergies   Allergen Reactions    Morphine Itching        Past Surgical History:   Procedure Laterality Date    BREAST BIOPSY Left         Social History     Tobacco Use    Smoking status: Every Day     Current packs/day: 1.00     Average packs/day: 1 pack/day for 40.8 years (40.8 ttl pk-yrs)     Types: Cigarettes     Start date: 1984     Passive exposure: Past    Smokeless tobacco: Never   Vaping Use    Vaping status: Never Used   Substance Use Topics    Alcohol use: Not Currently    Drug use: Defer       Family History   Problem Relation Age of Onset    Ovarian cancer Mother     Lung cancer Mother     Heart disease Father     No Known Problems Brother     Breast cancer Maternal Aunt     Breast cancer Maternal Aunt         Health Maintenance Due   Topic Date Due    COLORECTAL CANCER SCREENING  Never done    LUNG CANCER SCREENING  Never done    ANNUAL PHYSICAL  Never done    PAP SMEAR  Never done        Current Outpatient Medications on File Prior to Visit   Medication Sig    buPROPion XL (WELLBUTRIN XL) 300 MG 24 hr tablet Take 1 tablet by mouth Every Morning.    buPROPion XL (WELLBUTRIN XL) 300 MG 24 hr tablet Take 1 tablet by mouth Every Morning.    busPIRone (BUSPAR) 10 MG tablet Take 1 tablet by mouth 2 (Two) Times a Day.    busPIRone (BUSPAR) 7.5 MG tablet Take 1 tablet by mouth 2 (Two) Times a Day.    diclofenac (VOLTAREN) 75 MG EC tablet TAKE 1 TABLET BY MOUTH TWICE DAILY    hydrOXYzine pamoate (VISTARIL) 25 MG capsule Take 1 capsule by mouth 2 (Two) Times a Day As Needed. 1-2 " tabs BID PRN    hydrOXYzine pamoate (VISTARIL) 25 MG capsule Take 1-2 capsules by mouth 2 (Two) Times a Day As Needed.    hydrOXYzine pamoate (VISTARIL) 25 MG capsule Take 1-2 capsule(s) by mouth 2 Times a Day.    hydrOXYzine pamoate (VISTARIL) 25 MG capsule Take 1-2 capsule(s) by mouth 2 Times a Day.    hydrOXYzine pamoate (VISTARIL) 25 MG capsule Take 1-2 capsules by mouth 2 (Two) Times a Day.    omeprazole (priLOSEC) 40 MG capsule Take 1 capsule by mouth Daily.    tiZANidine (Zanaflex) 4 MG tablet Take 1 tablet by mouth At Night As Needed for Muscle Spasms.    traZODone (DESYREL) 100 MG tablet Take 1 tablet by mouth every night at bedtime.    traZODone (DESYREL) 100 MG tablet Take 1 tablet by mouth every night at bedtime.     No current facility-administered medications on file prior to visit.       Immunization History   Administered Date(s) Administered    COVID-19 (MODERNA) 1st,2nd,3rd Dose Monovalent 07/28/2021, 08/25/2021    COVID-19 (UNSPECIFIED) 07/28/2021, 08/25/2021    Flu Vaccine Quad PF >36MO 11/04/2021    Fluzone  >6mos 10/30/2024    Fluzone (or Fluarix & Flulaval for VFC) >6mos 11/04/2021, 11/01/2022    Tdap 03/13/2019       Review of Systems   Constitutional:  Positive for fatigue.   HENT:  Negative for trouble swallowing.    Eyes:  Negative for blurred vision and double vision.   Respiratory:  Negative for shortness of breath.    Cardiovascular:  Negative for chest pain and palpitations.   Gastrointestinal:  Negative for abdominal pain and blood in stool.   Genitourinary:  Negative for breast discharge, breast lump, breast pain, dysuria, menstrual problem and vaginal bleeding.        Has not had periods for 20 years since her partial hysterectomy   Musculoskeletal:  Positive for back pain.        Persistent for yrs--started worsening int he past 3 yrs--and had prior xrays and PT for this and still persistent --and radiates to both legs    Neurological:  Positive for syncope. Negative for dizziness,  "tremors, seizures, facial asymmetry, speech difficulty, weakness, light-headedness, numbness, headache, memory problem and confusion.        Syncope since childhood and then was in ER in 2022 and heart was R/O and then last week suffered with one    Psychiatric/Behavioral:  Negative for self-injury and suicidal ideas.         Objective     /72   Pulse 78   Temp 96.7 °F (35.9 °C) (Temporal)   Ht 168.9 cm (66.5\")   Wt 75.3 kg (166 lb)   SpO2 98%   BMI 26.39 kg/m²       Physical Exam  Vitals and nursing note reviewed.   Constitutional:       Appearance: Normal appearance.   HENT:      Head: Normocephalic.      Right Ear: External ear normal.      Left Ear: External ear normal.      Nose: Nose normal.      Mouth/Throat:      Mouth: Mucous membranes are moist.   Eyes:      Pupils: Pupils are equal, round, and reactive to light.   Cardiovascular:      Rate and Rhythm: Normal rate and regular rhythm.      Heart sounds: Normal heart sounds. No murmur heard.  Pulmonary:      Effort: Pulmonary effort is normal.      Breath sounds: Normal breath sounds.   Chest:   Breasts:     Mateusz Score is 5.      Right: No swelling, bleeding, inverted nipple, mass, nipple discharge, skin change or tenderness.      Left: No swelling, bleeding, inverted nipple, mass, nipple discharge, skin change or tenderness.   Abdominal:      General: Bowel sounds are normal.      Palpations: Abdomen is soft.      Tenderness: There is no abdominal tenderness.      Hernia: There is no hernia in the left inguinal area or right inguinal area.   Genitourinary:     General: Normal vulva.      Exam position: Lithotomy position.      Pubic Area: No rash or pubic lice.       Mateusz stage (genital): 5.      Labia:         Right: No rash, tenderness, lesion or injury.         Left: No rash, tenderness, lesion or injury.       Urethra: No prolapse, urethral pain, urethral swelling or urethral lesion.      Vagina: No signs of injury and foreign body. No " vaginal discharge, erythema, tenderness, bleeding, lesions or prolapsed vaginal walls.      Uterus: Absent.       Adnexa:         Right: No mass, tenderness or fullness.          Left: Tenderness and fullness present. No mass.        Comments: Obtained vaginal Pap smear  Musculoskeletal:         General: Normal range of motion.      Cervical back: Normal range of motion and neck supple.      Right lower leg: No edema.      Left lower leg: No edema.   Lymphadenopathy:      Upper Body:      Right upper body: No supraclavicular or axillary adenopathy.      Left upper body: No supraclavicular or axillary adenopathy.      Lower Body: No right inguinal adenopathy. No left inguinal adenopathy.   Skin:     General: Skin is warm and dry.   Neurological:      Mental Status: She is alert and oriented to person, place, and time.   Psychiatric:         Mood and Affect: Mood normal.         Behavior: Behavior normal.         Thought Content: Thought content normal.         Judgment: Judgment normal.         Result Review :     The following data was reviewed by: CLINTON Buchanan on 10/30/2024:                 POCT urinalysis dipstick, automated (10/30/2024 11:11)   XR Spine Lumbar 2 or 3 View (In Office) (01/10/2022 10:30)   XR Hips Bilateral With or Without Pelvis 2 View (01/10/2022 10:19)   ED with Go Dela Cruz MD (12/13/2022)   CT Head Without Contrast (12/13/2022 14:51)   ECG 12 Lead Syncope (12/13/2022 13:29)      Assessment and Plan      Diagnoses and all orders for this visit:    1. Women's annual routine gynecological examination (Primary)  -     POCT urinalysis dipstick, automated  -     IgP, Aptima HPV  -     Chlamydia trachomatis, Neisseria gonorrhoeae, PCR - Urine, Urine, Clean Catch  -     Hepatitis panel, acute  -     HIV-1/O/2 ANTIGEN/ANTIBODY, 4TH GENERATION  -     HSV 1 and 2-Specific Ab, IgG  -     RPR  -     US Non-ob Transvaginal; Future    2. Asymptomatic microscopic hematuria  -     Urine Culture  - Urine, Urine, Clean Catch    3. Syncope, unspecified syncope type, for yrs  -     Ambulatory Referral to Neurology    4. Brain fog, resolved  -     Ambulatory Referral to Neurology    5. S/P partial hysterectomy  -     IgP, Aptima HPV  -     US Non-ob Transvaginal; Future    6. Degeneration of intervertebral disc of lumbar region with discogenic back pain and lower extremity pain  -     MRI Lumbar Spine Without Contrast; Future  -     Ambulatory Referral to Neurosurgery    7. Left adnexal tenderness  -     US Non-ob Transvaginal; Future    8. Family history of ovarian cancer  -     US Non-ob Transvaginal; Future    9. Encounter for screening for lung cancer  -      CT Chest Low Dose Cancer Screening WO; Future    10. Screening for colon cancer    11. Need for influenza vaccination  -     Fluzone >6mos ([id number])    12. Screen for colon cancer  -     Cologuard - Stool, Per Rectum; Future    Since the brain fog has resolved and this is an ongoing issue with syncope and she did have the workup in the emergency room 2 years ago we will go ahead and proceed with evaluation by neurology and if that is all negative we can proceed with cardiology referral and evaluation    Influenza vaccine today    Ordering the Cologuard screening for colon cancer screening    MRI ordered and referral to neurosurgeon    Transvaginal ultrasound ordered for the left adnexal tenderness    Labs ordered as noted above    And has upcoming mammogram soon 11/7/2024 at 1130    And offered to order further labs but patient declined at this time and only one of the STD screenings today        Follow Up     Return if symptoms worsen or fail to improve.    Patient was given instructions and counseling regarding her condition or for health maintenance advice. Please see specific information pulled into the AVS if appropriate.            Deb Girma  reports that she has been smoking cigarettes. She started smoking about 40 years ago. She has a  40.8 pack-year smoking history. She has been exposed to tobacco smoke. She has never used smokeless tobacco. I have educated her on the risk of diseases from using tobacco products such as cancer, COPD, and heart disease.     I advised her to quit and she is not willing to quit.    I spent 3  minutes counseling the patient.

## 2024-10-30 NOTE — PROGRESS NOTES
Venipuncture Blood Specimen Collection  Venipuncture performed in left arm by Emmanuel Pina MA with good hemostasis. Patient tolerated the procedure well without complications.   10/30/24   Emmanuel Pina MA

## 2024-10-31 LAB
BACTERIA SPEC AEROBE CULT: NO GROWTH
HSV1 IGG SERPL QL IA: NON REACTIVE
HSV2 IGG SERPL QL IA: REACTIVE

## 2024-10-31 NOTE — PROGRESS NOTES
Please mail letter to patient stating    Deb for the STD screenings that we did the herpes simplex virus/HSV type I and II do show positive or reactive for herpes simplex type II which would be below the belt/genital area ---which means you have been exposed to some time in your life but I do not know if you have ever had an outbreak or not ---and normally we can place you on Valtrex 500 mg once daily for the next year if you have never had an outbreak to decrease the likelihood of shedding the virus and then if you have not had any outbreaks in 1 year when you follow-up you can continue that dose ---or if you have had outbreaks we can increase it to 1000 mg daily --allow --and if you ever have an outbreak of herpes in the genital area you would need to follow-up urgently or let me know so that I can send in the week supply for treatment because the treatment for an outbreak is a higher dose and frequency for 1 week than just the maintenance suppression -- and if you have any questions please let me know--but there is no way of knowing how long ago you were exposed because I do not have anything in the system to compare to    Chlamydia and gonorrhea were negative and RPR for syphilis negative and the HIV negative and hepatitis panel negative

## 2024-11-02 NOTE — PROGRESS NOTES
Please Dumas to patient stating    Deb the urine culture was negative and if anything changes in the finalized culture and requires any type of an antibiotic we would let you know

## 2024-11-05 LAB
CYTOLOGIST CVX/VAG CYTO: NORMAL
CYTOLOGY CVX/VAG DOC CYTO: NORMAL
CYTOLOGY CVX/VAG DOC THIN PREP: NORMAL
DX ICD CODE: NORMAL
HPV I/H RISK 4 DNA CVX QL PROBE+SIG AMP: NEGATIVE
Lab: NORMAL
OTHER STN SPEC: NORMAL
STAT OF ADQ CVX/VAG CYTO-IMP: NORMAL

## 2024-11-07 ENCOUNTER — HOSPITAL ENCOUNTER (OUTPATIENT)
Dept: MAMMOGRAPHY | Facility: HOSPITAL | Age: 59
Discharge: HOME | End: 2024-11-07
Admitting: NURSE PRACTITIONER
Payer: COMMERCIAL

## 2024-11-07 DIAGNOSIS — Z12.31 SCREENING MAMMOGRAM FOR BREAST CANCER: ICD-10-CM

## 2024-11-07 PROCEDURE — 77063 BREAST TOMOSYNTHESIS BI: CPT

## 2024-11-07 PROCEDURE — 77067 SCR MAMMO BI INCL CAD: CPT

## 2024-11-07 NOTE — PROGRESS NOTES
Please mail letter to patient stating    Deb the mammogram was read as negative/benign and for you to continue doing your annual mammographic screenings and I would like for you to continue doing your monthly self breast exams please

## 2024-11-12 ENCOUNTER — PATIENT MESSAGE (OUTPATIENT)
Dept: FAMILY MEDICINE CLINIC | Facility: CLINIC | Age: 59
End: 2024-11-12
Payer: COMMERCIAL

## 2024-11-12 DIAGNOSIS — B96.89 BACTERIAL VAGINOSIS: Primary | ICD-10-CM

## 2024-11-12 DIAGNOSIS — N76.0 BACTERIAL VAGINOSIS: Primary | ICD-10-CM

## 2024-11-12 RX ORDER — METRONIDAZOLE 500 MG/1
500 TABLET ORAL 2 TIMES DAILY
Qty: 14 TABLET | Refills: 0 | Status: SHIPPED | OUTPATIENT
Start: 2024-11-12 | End: 2024-11-19

## 2024-12-13 ENCOUNTER — HOSPITAL ENCOUNTER (OUTPATIENT)
Dept: CT IMAGING | Facility: HOSPITAL | Age: 59
Discharge: HOME | End: 2024-12-13
Payer: COMMERCIAL

## 2024-12-13 ENCOUNTER — HOSPITAL ENCOUNTER (OUTPATIENT)
Dept: ULTRASOUND IMAGING | Facility: HOSPITAL | Age: 59
Discharge: HOME | End: 2024-12-13
Payer: COMMERCIAL

## 2024-12-13 ENCOUNTER — HOSPITAL ENCOUNTER (OUTPATIENT)
Dept: MRI IMAGING | Facility: HOSPITAL | Age: 59
Discharge: HOME | End: 2024-12-13
Payer: COMMERCIAL

## 2024-12-13 DIAGNOSIS — M51.362 DEGENERATION OF INTERVERTEBRAL DISC OF LUMBAR REGION WITH DISCOGENIC BACK PAIN AND LOWER EXTREMITY PAIN: ICD-10-CM

## 2024-12-13 DIAGNOSIS — R10.2 LEFT ADNEXAL TENDERNESS: ICD-10-CM

## 2024-12-13 DIAGNOSIS — Z80.41 FAMILY HISTORY OF OVARIAN CANCER: ICD-10-CM

## 2024-12-13 DIAGNOSIS — Z12.2 ENCOUNTER FOR SCREENING FOR LUNG CANCER: ICD-10-CM

## 2024-12-13 DIAGNOSIS — Z01.419 WOMEN'S ANNUAL ROUTINE GYNECOLOGICAL EXAMINATION: ICD-10-CM

## 2024-12-13 DIAGNOSIS — Z90.711 S/P PARTIAL HYSTERECTOMY: ICD-10-CM

## 2024-12-13 PROCEDURE — 72148 MRI LUMBAR SPINE W/O DYE: CPT

## 2024-12-13 PROCEDURE — 76830 TRANSVAGINAL US NON-OB: CPT

## 2024-12-13 PROCEDURE — 71271 CT THORAX LUNG CANCER SCR C-: CPT

## 2024-12-16 NOTE — PROGRESS NOTES
Please mail letter to patient stating    Deb the CT of the chest for lung cancer screening shows multiple groundglass opacities in the right lung more predominant in the right upper lobe of the lung and it could be either infectious or inflammatory it does say need to repeat the CT scan of the chest/lungs at 12 months and if you are sick or having any acute illness symptoms please let me know and we can try to treat that or have you follow-up in the office-otherwise if you feel like you are having any issues with your lungs we can always get you referred for further evaluation with the lung specialist/pulmonologist

## 2024-12-16 NOTE — PROGRESS NOTES
Please mail letter to patient stating    Deb the MRI of the lumbar spine shows a severe right foraminal narrowing at the L5-S1 due to the disc bulge and the facet disease which is like arthritis-and let me know if you would like me to get you referred to the neurosurgeon for further evaluation and treatment and I can place that referral order

## 2024-12-16 NOTE — PROGRESS NOTES
Please mail letter to patient stating    Deb the transvaginal ultrasound shows prior hysterectomy and then the left and right ovaries were not visualized transvaginally and there were no abnormalities seen

## 2024-12-30 ENCOUNTER — TELEPHONE (OUTPATIENT)
Dept: NEUROLOGY | Facility: CLINIC | Age: 59
End: 2024-12-30
Payer: COMMERCIAL

## 2024-12-30 NOTE — TELEPHONE ENCOUNTER
Spoke to pt in regards to r/s appt due to pt request, r/s for  in [facility] but had put patient on wait list.

## 2025-01-15 ENCOUNTER — OFFICE VISIT (OUTPATIENT)
Dept: NEUROSURGERY | Facility: CLINIC | Age: 60
End: 2025-01-15
Payer: COMMERCIAL

## 2025-01-15 VITALS
HEART RATE: 80 BPM | SYSTOLIC BLOOD PRESSURE: 133 MMHG | BODY MASS INDEX: 26.68 KG/M2 | WEIGHT: 170 LBS | DIASTOLIC BLOOD PRESSURE: 87 MMHG | HEIGHT: 67 IN

## 2025-01-15 DIAGNOSIS — M54.42 CHRONIC MIDLINE LOW BACK PAIN WITH BILATERAL SCIATICA: ICD-10-CM

## 2025-01-15 DIAGNOSIS — M47.816 FACET ARTHROPATHY, LUMBAR: ICD-10-CM

## 2025-01-15 DIAGNOSIS — M48.061 FORAMINAL STENOSIS OF LUMBAR REGION: ICD-10-CM

## 2025-01-15 DIAGNOSIS — G89.29 CHRONIC MIDLINE LOW BACK PAIN WITH BILATERAL SCIATICA: ICD-10-CM

## 2025-01-15 DIAGNOSIS — M54.41 CHRONIC MIDLINE LOW BACK PAIN WITH BILATERAL SCIATICA: ICD-10-CM

## 2025-01-15 DIAGNOSIS — D17.79 EPIDURAL LIPOMATOSIS: ICD-10-CM

## 2025-01-15 DIAGNOSIS — M51.362 DEGENERATION OF INTERVERTEBRAL DISC OF LUMBAR REGION WITH DISCOGENIC BACK PAIN AND LOWER EXTREMITY PAIN: Primary | ICD-10-CM

## 2025-01-15 NOTE — PROGRESS NOTES
"Chief Complaint  Back Pain, Hip Pain (Bilateral ), Leg Pain (Bilateral  to knees at times ), and Numbness (Left leg )    Subjective          Deb Rayo who is a 59 y.o. year old female who presents to [facility] for Evaluation of the Spine.     The patient complains of pain located in the Lumbar Spine.  Patients states the pain has been present for 3 years.  The pain came on gradually.  The pain scaled level is 4.  The pain does radiate. Dermatomes are located bilaterally Lumbar at: not below the knee.  The pain is constant and waxing/waning and described as throbbing and \"intense tight pain\" .  The pain is worse at no particular time of day. Patient states Rest and NSAIDs makes the pain better.  Patient states Prolonged Standing, Prolonged Walking, Bending, and Twisting makes the pain worse.    Associated Symptoms Include: Numbness in the left thigh, \"lock up\" when she steps a certain way. She reports subjective weakness in the legs all the times. She denies loss of bowel or bladder control.  Conservative Interventions Include: Physical Therapy that was ineffective. NSAID is somewhat helpful.    Was this the result of an injury or accident?: Yes, Fall while sitting in a chair where the seat \"fell through\"    History of Previous Spinal Surgery?: No     reports that she has been smoking cigarettes. She started smoking about 41 years ago. She has a 41 pack-year smoking history. She has been exposed to tobacco smoke. She has never used smokeless tobacco.    Review of Systems   Musculoskeletal:  Positive for back pain.        Objective   Vital Signs:   /87   Pulse 80   Ht 168.9 cm (66.5\")   Wt 77.1 kg (170 lb)   BMI 27.03 kg/m²       Physical Exam  Constitutional:       Appearance: Normal appearance.   Pulmonary:      Effort: Pulmonary effort is normal.   Musculoskeletal:         General: No tenderness.      Comments: SLR negative bialterally   Neurological:      " General: No focal deficit present.      Mental Status: She is alert and oriented to person, place, and time.      Sensory: No sensory deficit.      Motor: No weakness.      Deep Tendon Reflexes: Reflexes normal.   Psychiatric:         Mood and Affect: Mood normal.         Behavior: Behavior normal.        Neurological Exam  Mental Status  Alert. Oriented to person, place, and time.      Result Review     I have personally interpreted the MRI of the lumbar spine without contrast from 12/13/2024 which shows multilevel degenerative disc disease and facet arthritis.  There is some epidural lipomatosis.  There is no significant central canal narrowing.  There is severe right foraminal narrowing at L5-S1.  There is mild left foraminal narrowing at L4-L5 and L5-S1.     Assessment and Plan    Diagnoses and all orders for this visit:    1. Degeneration of intervertebral disc of lumbar region with discogenic back pain and lower extremity pain (Primary)  -     Ambulatory Referral to Pain Management    2. Facet arthropathy, lumbar  -     Ambulatory Referral to Pain Management    3. Foraminal stenosis of lumbar region  -     Ambulatory Referral to Pain Management    4. Epidural lipomatosis    5. Chronic midline low back pain with bilateral sciatica  -     Ambulatory Referral to Pain Management    She has pain in the back and both legs not past the knees.    She has severe right foraminal stenosis at L5-S1. We discussed that surgery for this change is unlikely to relieve back pain, and less likely to relieve non-specific leg pain. It would not help left leg pain.    I would not recommend surgery as a first line treatment.    She may revisit PT, but this did not help before.    She may consider LESI vs MBBs/RFA. I will refer to [facility] in [place] at her request.    The patient was counseled on basic recommendations for the reduction and prevention of back, neck, or spine pain in association with spinal disorders,  including: cessation/avoidance of nicotine use, maintenance of a healthy BMI and weight, focusing on building/maintaining core strength through core exercise, and avoidance of activities which worsen the pain. The patient will monitor for changes in symptoms and notify our clinic of these changes as needed.    Follow Up   Return if symptoms worsen or fail to improve.  Patient was given instructions and counseling regarding her condition or for health maintenance advice. Please see specific information pulled into the AVS if appropriate.

## 2025-01-17 DIAGNOSIS — M54.41 CHRONIC BILATERAL LOW BACK PAIN WITH RIGHT-SIDED SCIATICA: ICD-10-CM

## 2025-01-17 DIAGNOSIS — M51.369 DDD (DEGENERATIVE DISC DISEASE), LUMBAR: ICD-10-CM

## 2025-01-17 DIAGNOSIS — G89.29 CHRONIC BILATERAL LOW BACK PAIN WITH RIGHT-SIDED SCIATICA: ICD-10-CM

## 2025-01-17 DIAGNOSIS — M25.552 CHRONIC HIP PAIN, BILATERAL: ICD-10-CM

## 2025-01-17 DIAGNOSIS — M16.0 OSTEOARTHRITIS OF BOTH HIPS, UNSPECIFIED OSTEOARTHRITIS TYPE: ICD-10-CM

## 2025-01-17 DIAGNOSIS — G89.29 CHRONIC HIP PAIN, BILATERAL: ICD-10-CM

## 2025-01-17 DIAGNOSIS — M25.551 CHRONIC HIP PAIN, BILATERAL: ICD-10-CM

## 2025-01-18 NOTE — TELEPHONE ENCOUNTER
Refills have been requested for the following medications:         tiZANidine (Zanaflex) 4 MG tablet [Estela Lopez]     Preferred pharmacy: [facility]  Delivery method: UPS Ground    ____________________________________________    On the patient's list she has listed that she is no longer taking this

## 2025-01-23 ENCOUNTER — PATIENT ROUNDING (BHMG ONLY) (OUTPATIENT)
Dept: NEUROSURGERY | Facility: CLINIC | Age: 60
End: 2025-01-23
Payer: COMMERCIAL

## 2025-01-30 ENCOUNTER — TELEPHONE (OUTPATIENT)
Dept: FAMILY MEDICINE CLINIC | Facility: CLINIC | Age: 60
End: 2025-01-30
Payer: COMMERCIAL

## 2025-01-30 DIAGNOSIS — C44.519 BASAL CELL CARCINOMA (BCC) OF CHEST: ICD-10-CM

## 2025-01-30 DIAGNOSIS — C44.612 BASAL CELL CARCINOMA (BCC) OF SKIN OF RIGHT UPPER EXTREMITY INCLUDING SHOULDER: ICD-10-CM

## 2025-01-30 DIAGNOSIS — C44.311 BASAL CELL CARCINOMA (BCC) OF SKIN OF NOSE: Primary | ICD-10-CM

## 2025-01-30 NOTE — TELEPHONE ENCOUNTER
Finally received [facility] faxed pathology results of basal cell carcinoma on the nasal root and basal cell carcinoma on the forearm and basal carcinoma on the medial chest and basal cell carcinoma on another area of the right arm and this was all dated as the final pathology report 2/13/2024 and patient has called requesting referral to plastic surgeon

## 2025-03-04 NOTE — PROGRESS NOTES
CHIEF COMPLAINT  Lower back pain      Subjective   Deb Rayo is a 59 y.o. female who was referred by  Rod Negrete PA-C to our pain management clinic for consultation, evaluation and treatment of lower back pain. Pain started years ago and has significantly worsened recently.  Patient has tried PT without significant improvement.  NSAIDs were moderately helpful. Pain has worsened over last 2 years.       Lower back pain is 4/10 on VAS, at maximum is 8/10. Pain is aching, dull, stabbing in nature. Pain is referred lateral thigh. The pain is constant. The pain is improved by NSAIDs. The pain is worse with walking standing. Unable to cook or walk in grocery store due to pain. Unable to sleep much due to pain.  Her main complaint is bilateral leg pain.    She has been diagnosed with b/l trochanteric bursitis by Ortho. Keeps her up at night.     PHQ-9- 10    SOAPP- 5  [place] back disability scale - 58    PMH:   Depression, IBS, smoker-1 PPD, carpal tunnel syndrome    Current Medications:   Diclofenac 75 mg   Tizanidine 4 mg  Wellbutrin  Trazodone      Past Medications:    Past Modalities:  TENS:       no          Physical Therapy Within The Last 6 Months     yes  Psychotherapy     no  Massage Therapy      no    Patient Complains Of:  Uro-Fecal Incontinence no  Weight Gain/Loss  no  Fever/Chills   no  Weakness   no      PEG Assessment   What number best describes your pain on average in the past week?5  What number best describes how, during the past week, pain has interfered with your enjoyment of life?6  What number best describes how, during the past week, pain has interfered with your general activity?  6    PHQ-9 Depression Screening  Little interest or pleasure in doing things? Several days   Feeling down, depressed, or hopeless? Several days   PHQ-2 Total Score 2   Trouble falling or staying asleep, or sleeping too much? Almost all   Feeling tired or having little energy? Almost all   Poor appetite or  overeating? Several days   Feeling bad about yourself - or that you are a failure or have let yourself or your family down? Not at all   Trouble concentrating on things, such as reading the newspaper or watching television? Several days   Moving or speaking so slowly that other people could have noticed? Or the opposite - being so fidgety or restless that you have been moving around a lot more than usual? Not at all   Thoughts that you would be better off dead, or of hurting yourself in some way? Not at all   PHQ-9 Total Score 10   If you checked off any problems, how difficult have these problems made it for you to do your work, take care of things at home, or get along with other people? Somewhat difficult        Patient screened positive for depression based on a PHQ-9 score of 10 on 3/5/2025. Follow-up recommendations include: Suicide Risk Assessment performed.        Current Outpatient Medications:     buPROPion XL (WELLBUTRIN XL) 300 MG 24 hr tablet, Take 1 tablet by mouth Every Morning., Disp: , Rfl:     buPROPion XL (WELLBUTRIN XL) 300 MG 24 hr tablet, Take 1 tablet by mouth Every Morning., Disp: 30 tablet, Rfl: 2    busPIRone (BUSPAR) 10 MG tablet, Take 1 tablet by mouth 2 (Two) Times a Day., Disp: 60 tablet, Rfl: 2    busPIRone (BUSPAR) 7.5 MG tablet, Take 1 tablet by mouth 2 (Two) Times a Day., Disp: 60 tablet, Rfl: 2    cholecalciferol (VITAMIN D3) 1.25 MG (30084 UT) capsule, Take  by mouth., Disp: , Rfl:     hydrOXYzine pamoate (VISTARIL) 25 MG capsule, Take 1 capsule by mouth 2 (Two) Times a Day As Needed. 1-2 tabs BID PRN, Disp: , Rfl:     hydrOXYzine pamoate (VISTARIL) 25 MG capsule, Take 1-2 capsules by mouth 2 (Two) Times a Day As Needed., Disp: 60 capsule, Rfl: 1    hydrOXYzine pamoate (VISTARIL) 25 MG capsule, Take 1-2 capsule(s) by mouth 2 Times a Day., Disp: 60 capsule, Rfl: 0    hydrOXYzine pamoate (VISTARIL) 25 MG capsule, Take 1-2 capsule(s) by mouth 2 Times a Day., Disp: 60 capsule, Rfl:  2    hydrOXYzine pamoate (VISTARIL) 25 MG capsule, Take 1-2 capsules by mouth 2 (Two) Times a Day., Disp: 60 capsule, Rfl: 2    omeprazole (priLOSEC) 40 MG capsule, Take 1 capsule by mouth Daily., Disp: 30 capsule, Rfl: 5    tiZANidine (Zanaflex) 4 MG tablet, Take 1 tablet by mouth At Night As Needed for Muscle Spasms., Disp: 30 tablet, Rfl: 0    traZODone (DESYREL) 100 MG tablet, Take 1 tablet by mouth every night at bedtime., Disp: , Rfl:     traZODone (DESYREL) 100 MG tablet, Take 1 tablet by mouth every night at bedtime., Disp: 30 tablet, Rfl: 2    diclofenac (VOLTAREN) 75 MG EC tablet, Take 1 tablet by mouth 2 (Two) Times a Day., Disp: 60 tablet, Rfl: 3    The following portions of the patient's history were reviewed and updated as appropriate: allergies, current medications, past family history, past medical history, past social history, past surgical history, and problem list.      REVIEW OF PERTINENT MEDICAL DATA    Past Medical History:   Diagnosis Date    Arthritis 2022    recently diagnosed    Cancer 10 yrs ago    skin cancer inc melanoma    Cervical disc disorder     CTS (carpal tunnel syndrome)     Low back pain     Lumbosacral disc disease      Past Surgical History:   Procedure Laterality Date    BREAST BIOPSY Left      Family History   Problem Relation Age of Onset    Ovarian cancer Mother     Lung cancer Mother     Cancer Mother     Heart disease Father     No Known Problems Brother     Breast cancer Maternal Aunt     Breast cancer Maternal Aunt     Cancer Maternal Aunt     COPD Maternal Grandmother      Social History     Socioeconomic History    Marital status: Single   Tobacco Use    Smoking status: Every Day     Current packs/day: 1.00     Average packs/day: 1 pack/day for 41.2 years (41.2 ttl pk-yrs)     Types: Cigarettes     Start date: 1/1/1984     Passive exposure: Past    Smokeless tobacco: Never   Vaping Use    Vaping status: Never Used   Substance and Sexual Activity    Alcohol use: Yes      Alcohol/week: 6.0 standard drinks of alcohol     Types: 6 Cans of beer per week     Comment: BEERS ON WEEKENDS    Drug use: Never     Comment: Hemp gummies NOT THC    Sexual activity: Yes     Partners: Male     Birth control/protection: Hysterectomy         Review of Systems   Musculoskeletal:  Positive for arthralgias and back pain.         Vitals:    03/05/25 1510   BP: 144/98   Pulse: 75   Resp: 16   SpO2: 98%   Weight: 77.6 kg (171 lb)   PainSc: 5          Objective   Physical Exam  Musculoskeletal:         General: Tenderness present.        Legs:            Imaging Reviewed:  MRI lumbar spine without contrast-12/13/2024  - L2-3-mild disc bulge and canal stenosis  L3-4-moderate canal stenosis with disc bulge  L4-5 disc bulge, mild canal stenosis and mild foraminal narrowing  L5-S1-disc bulge with severe right and mild left foraminal narrowing.  Facet arthritis present.  Degenerative marrow edema at this level as well.  A dorsal epidural fat from L3-5  - Multilevel facet arthritis.    Assessment:    1. Lumbar spondylosis    2. Trochanteric bursitis of both hips    3. Sacroiliac joint dysfunction    4. Lumbar radiculopathy         Plan:   1.  Defer UDS for now.  2. We discussed trying a course of formal physical therapy.  Physical therapy can help strengthen and stretch the muscles around the joints. Continue to be as active as possible.  Patient has tried physical therapy without significant improvement in pain.  3.  Restart diclofenac 75 mg BID PRN. Patient informed of increased risk of heart attacks, stroke and kidney problems in addition to gastric ulcers with use of non-steroidal anti-inflammatory medications.  4. For bilateral trochanteric bursitis pain, start using Voltaren 1% gel up to 2-3 times a day to trochanteric bursa as needed.  5.  MRI was reviewed with patient.  Facet arthritic changes with moderate canal stenosis at L3-4 and L5-S1.  Patient's main complaint is leg pain.  No specific  dermatomes. Patient has pain in the lower back with referred pain in the leg, patient has failed conservative therapy including PT and pharmacological management for more than 6 weeks and pain interferes with activities of daily living. Discussed lumbar SHAWN L5-S1. Discussed the possibility of infection, bleeding, nerve damage, post dural puncture headache, increased pain, paraplegia. Patient understands and agrees.   6.  May consider MBB and if successful RFA if axial back pain continues to bother her after LESI.      Deb Rayo  reports that she has been smoking cigarettes. She started smoking about 41 years ago. She has a 41.2 pack-year smoking history. She has been exposed to tobacco smoke. She has never used smokeless tobacco. I have educated her on the risk of diseases from using tobacco products such as cancer, COPD, and heart disease.     I advised her to quit and she is not willing to quit.    I spent 4 minutes counseling the patient.      RTC for injection and then 3 week follow up.     Aristides Hawkins DO  Pain Management   [facility]         INSPECT REPORT    As part of the patient's treatment plan, I may be prescribing controlled substances. The patient has been made aware of appropriate use of such medications, including potential risk of somnolence, limited ability to drive and/or work safely, and the potential for dependence or overdose. It has also been made clear that these medications are for use by this patient only, without concomitant use of alcohol or other substances unless prescribed.     Patient has completed prescribing agreement detailing terms of continued prescribing of controlled substances, including monitoring INSPECT reports, urine drug screening, and pill counts if necessary. The patient is aware that inappropriate use will results in cessation of prescribing such medications.    INSPECT report has been reviewed and scanned into the patient's chart.      EMR  Dragon/Transcription Disclaimer:   Much of this encounter note is an electronic transcription/translation of spoken language to printed text. The electronic translation of spoken language may permit erroneous, or at times, nonsensical words or phrases to be inadvertently transcribed; Although I have reviewed the note for such errors, some may still exist.

## 2025-03-05 ENCOUNTER — OFFICE VISIT (OUTPATIENT)
Dept: PAIN MEDICINE | Facility: CLINIC | Age: 60
End: 2025-03-05
Payer: COMMERCIAL

## 2025-03-05 VITALS
HEART RATE: 75 BPM | SYSTOLIC BLOOD PRESSURE: 144 MMHG | RESPIRATION RATE: 16 BRPM | OXYGEN SATURATION: 98 % | DIASTOLIC BLOOD PRESSURE: 98 MMHG | WEIGHT: 171 LBS | BODY MASS INDEX: 27.19 KG/M2

## 2025-03-05 DIAGNOSIS — M53.3 SACROILIAC JOINT DYSFUNCTION: ICD-10-CM

## 2025-03-05 DIAGNOSIS — M70.61 TROCHANTERIC BURSITIS OF BOTH HIPS: ICD-10-CM

## 2025-03-05 DIAGNOSIS — M54.16 LUMBAR RADICULOPATHY: ICD-10-CM

## 2025-03-05 DIAGNOSIS — M70.62 TROCHANTERIC BURSITIS OF BOTH HIPS: ICD-10-CM

## 2025-03-05 DIAGNOSIS — M47.816 LUMBAR SPONDYLOSIS: Primary | ICD-10-CM

## 2025-03-05 RX ORDER — DICLOFENAC SODIUM 75 MG/1
75 TABLET, DELAYED RELEASE ORAL 2 TIMES DAILY
Qty: 60 TABLET | Refills: 3 | Status: SHIPPED | OUTPATIENT
Start: 2025-03-05

## 2025-06-11 ENCOUNTER — OFFICE VISIT (OUTPATIENT)
Dept: FAMILY MEDICINE CLINIC | Facility: CLINIC | Age: 60
End: 2025-06-11
Payer: COMMERCIAL

## 2025-06-11 VITALS
HEART RATE: 95 BPM | SYSTOLIC BLOOD PRESSURE: 140 MMHG | OXYGEN SATURATION: 97 % | DIASTOLIC BLOOD PRESSURE: 82 MMHG | BODY MASS INDEX: 27.12 KG/M2 | TEMPERATURE: 97.3 F | HEIGHT: 67 IN | WEIGHT: 172.8 LBS

## 2025-06-11 DIAGNOSIS — Z53.20 COLON CANCER SCREENING DECLINED: ICD-10-CM

## 2025-06-11 DIAGNOSIS — Z00.00 ANNUAL PHYSICAL EXAM: Primary | ICD-10-CM

## 2025-06-11 DIAGNOSIS — R53.83 FATIGUE, UNSPECIFIED TYPE: ICD-10-CM

## 2025-06-11 DIAGNOSIS — E66.3 OVERWEIGHT (BMI 25.0-29.9): ICD-10-CM

## 2025-06-11 DIAGNOSIS — I10 PRIMARY HYPERTENSION: ICD-10-CM

## 2025-06-11 LAB
25(OH)D3 SERPL-MCNC: 20.1 NG/ML (ref 30–100)
ALBUMIN SERPL-MCNC: 4.5 G/DL (ref 3.5–5.2)
ALBUMIN/GLOB SERPL: 1.5 G/DL
ALP SERPL-CCNC: 79 U/L (ref 39–117)
ALT SERPL W P-5'-P-CCNC: 14 U/L (ref 1–33)
ANION GAP SERPL CALCULATED.3IONS-SCNC: 9.4 MMOL/L (ref 5–15)
AST SERPL-CCNC: 23 U/L (ref 1–32)
BASOPHILS # BLD AUTO: 0.05 10*3/MM3 (ref 0–0.2)
BASOPHILS NFR BLD AUTO: 0.7 % (ref 0–1.5)
BILIRUB SERPL-MCNC: 0.4 MG/DL (ref 0–1.2)
BILIRUB UR QL STRIP: NEGATIVE
BUN SERPL-MCNC: 14 MG/DL (ref 8–23)
BUN/CREAT SERPL: 15.6 (ref 7–25)
CALCIUM SPEC-SCNC: 10 MG/DL (ref 8.6–10.5)
CHLORIDE SERPL-SCNC: 105 MMOL/L (ref 98–107)
CHOLEST SERPL-MCNC: 255 MG/DL (ref 0–200)
CLARITY UR: CLEAR
CO2 SERPL-SCNC: 26.6 MMOL/L (ref 22–29)
COLOR UR: YELLOW
CREAT SERPL-MCNC: 0.9 MG/DL (ref 0.57–1)
DEPRECATED RDW RBC AUTO: 44.5 FL (ref 37–54)
EGFRCR SERPLBLD CKD-EPI 2021: 73.3 ML/MIN/1.73
EOSINOPHIL # BLD AUTO: 0.1 10*3/MM3 (ref 0–0.4)
EOSINOPHIL NFR BLD AUTO: 1.5 % (ref 0.3–6.2)
ERYTHROCYTE [DISTWIDTH] IN BLOOD BY AUTOMATED COUNT: 11.9 % (ref 12.3–15.4)
FOLATE SERPL-MCNC: 9.55 NG/ML (ref 4.78–24.2)
GLOBULIN UR ELPH-MCNC: 3.1 GM/DL
GLUCOSE SERPL-MCNC: 101 MG/DL (ref 65–99)
GLUCOSE UR STRIP-MCNC: NEGATIVE MG/DL
HCT VFR BLD AUTO: 39.8 % (ref 34–46.6)
HDLC SERPL-MCNC: 80 MG/DL (ref 40–60)
HGB BLD-MCNC: 13.5 G/DL (ref 12–15.9)
HGB UR QL STRIP.AUTO: NEGATIVE
HOLD SPECIMEN: NORMAL
IMM GRANULOCYTES # BLD AUTO: 0.02 10*3/MM3 (ref 0–0.05)
IMM GRANULOCYTES NFR BLD AUTO: 0.3 % (ref 0–0.5)
KETONES UR QL STRIP: NEGATIVE
LDLC SERPL CALC-MCNC: 155 MG/DL (ref 0–100)
LDLC/HDLC SERPL: 1.9 {RATIO}
LEUKOCYTE ESTERASE UR QL STRIP.AUTO: NEGATIVE
LYMPHOCYTES # BLD AUTO: 1.96 10*3/MM3 (ref 0.7–3.1)
LYMPHOCYTES NFR BLD AUTO: 28.5 % (ref 19.6–45.3)
MCH RBC QN AUTO: 34.4 PG (ref 26.6–33)
MCHC RBC AUTO-ENTMCNC: 33.9 G/DL (ref 31.5–35.7)
MCV RBC AUTO: 101.5 FL (ref 79–97)
MONOCYTES # BLD AUTO: 0.62 10*3/MM3 (ref 0.1–0.9)
MONOCYTES NFR BLD AUTO: 9 % (ref 5–12)
NEUTROPHILS NFR BLD AUTO: 4.13 10*3/MM3 (ref 1.7–7)
NEUTROPHILS NFR BLD AUTO: 60 % (ref 42.7–76)
NITRITE UR QL STRIP: NEGATIVE
NRBC BLD AUTO-RTO: 0 /100 WBC (ref 0–0.2)
PH UR STRIP.AUTO: 5.5 [PH] (ref 5–8)
PLATELET # BLD AUTO: 178 10*3/MM3 (ref 140–450)
PMV BLD AUTO: 12.7 FL (ref 6–12)
POTASSIUM SERPL-SCNC: 4.5 MMOL/L (ref 3.5–5.2)
PROT SERPL-MCNC: 7.6 G/DL (ref 6–8.5)
PROT UR QL STRIP: NEGATIVE
RBC # BLD AUTO: 3.92 10*6/MM3 (ref 3.77–5.28)
SODIUM SERPL-SCNC: 141 MMOL/L (ref 136–145)
SP GR UR STRIP: 1.02 (ref 1–1.03)
TRIGL SERPL-MCNC: 117 MG/DL (ref 0–150)
TSH SERPL DL<=0.05 MIU/L-ACNC: 1.54 UIU/ML (ref 0.27–4.2)
UROBILINOGEN UR QL STRIP: NORMAL
VIT B12 BLD-MCNC: 236 PG/ML (ref 211–946)
VLDLC SERPL-MCNC: 20 MG/DL (ref 5–40)
WBC NRBC COR # BLD AUTO: 6.88 10*3/MM3 (ref 3.4–10.8)

## 2025-06-11 PROCEDURE — 82607 VITAMIN B-12: CPT | Performed by: NURSE PRACTITIONER

## 2025-06-11 PROCEDURE — 82746 ASSAY OF FOLIC ACID SERUM: CPT | Performed by: NURSE PRACTITIONER

## 2025-06-11 PROCEDURE — 80061 LIPID PANEL: CPT | Performed by: NURSE PRACTITIONER

## 2025-06-11 PROCEDURE — 82306 VITAMIN D 25 HYDROXY: CPT | Performed by: NURSE PRACTITIONER

## 2025-06-11 PROCEDURE — 80050 GENERAL HEALTH PANEL: CPT | Performed by: NURSE PRACTITIONER

## 2025-06-11 PROCEDURE — 81003 URINALYSIS AUTO W/O SCOPE: CPT | Performed by: NURSE PRACTITIONER

## 2025-06-11 RX ORDER — LISINOPRIL 5 MG/1
5 TABLET ORAL DAILY
Qty: 90 TABLET | Refills: 0 | Status: SHIPPED | OUTPATIENT
Start: 2025-06-11

## 2025-06-11 NOTE — PROGRESS NOTES
Chief Complaint  Weight Check (Would like to discuss weight loss options.) and Hypertension (Bp has been running high. )    Subjective            Deb Rayo presents to [facility]  History of Present Illness    History of Present Illness  The patient is a 59-year-old female who presents today with concerns about her weight and elevated blood pressure. And due for her wellness from primary care standpoint     Hypertension: New diagnoses and patient was seen by another provider in March and blood pressure was elevated and then elevated today and then she is also been monitoring at home and it has been elevated as well even higher than it is in the offices --she has been experiencing elevated blood pressure, as observed by her boyfriend who monitors his own blood pressure. She reports occasional headaches, dizziness, lightheadedness, and blurred vision. She also mentions a recent episode of fainting approximately a month ago, which was evaluated in the hospital. She does not experience any chest pain, shortness of breath, or palpitations.    She expresses interest in weight loss medications such as Ozempic or Wegovy. Despite maintaining a healthy diet and regular exercise, she struggles with weight loss, which she believes contributes to her fatigue. She reports no painful urination, vaginal bleeding, excessive thirst or hunger, frequent urination, suicidal or self-injurious thoughts, abdominal pain, or blood in stool. She also reports no difficulty swallowing or lumps in the throat.    She has been smoking for 40 years and has not set a quit date.    She is up to date on CT of the chest.    Fatigued-we will go ahead and proceed with ordering the vitamin B12 folic acid vitamin D comprehensive and thyroid levels    She has not completed the Cologuard test and declines a colonoscopy due to personal discomfort.  And she actually declines any colon cancer screening at this  time    SOCIAL HISTORY  She has been smoking for 40 years.    FAMILY HISTORY  She reports no family history of medullary thyroid cancer, multiple endocrine neoplasia syndrome, pancreatic cancer, or thyroid cancer.      PHQ-2 Total Score:      PHQ-9 Total Score:        Past Medical History:   Diagnosis Date    Arthritis 2022    recently diagnosed    Cancer 10 yrs ago    skin cancer inc melanoma    Cervical disc disorder     CTS (carpal tunnel syndrome)     Low back pain     Lumbosacral disc disease        Allergies   Allergen Reactions    Morphine Itching        Past Surgical History:   Procedure Laterality Date    BREAST BIOPSY Left         Social History     Tobacco Use    Smoking status: Every Day     Current packs/day: 1.00     Average packs/day: 1 pack/day for 41.4 years (41.4 ttl pk-yrs)     Types: Cigarettes     Start date: 1/1/1984     Passive exposure: Past    Smokeless tobacco: Never   Vaping Use    Vaping status: Never Used   Substance Use Topics    Alcohol use: Yes     Alcohol/week: 6.0 standard drinks of alcohol     Types: 6 Cans of beer per week     Comment: BEERS ON WEEKENDS    Drug use: Never     Comment: Hemp gummies NOT THC       Family History   Problem Relation Age of Onset    Ovarian cancer Mother     Lung cancer Mother     Cancer Mother     Heart disease Father     No Known Problems Brother     Breast cancer Maternal Aunt     Breast cancer Maternal Aunt     Cancer Maternal Aunt     COPD Maternal Grandmother         Health Maintenance Due   Topic Date Due    ANNUAL PHYSICAL  Never done        Current Outpatient Medications on File Prior to Visit   Medication Sig    buPROPion XL (Wellbutrin XL) 300 MG 24 hr tablet Take 1 tablet by mouth Every Morning.    busPIRone (BUSPAR) 15 MG tablet Take 1 tablet by mouth 2 (Two) Times a Day.    diclofenac (VOLTAREN) 75 MG EC tablet Take 1 tablet by mouth 2 (Two) Times a Day.    [DISCONTINUED] buPROPion XL (WELLBUTRIN XL) 300 MG 24 hr tablet Take 1 tablet by  mouth Every Morning.    [DISCONTINUED] hydrOXYzine pamoate (VISTARIL) 25 MG capsule Take 1 capsule by mouth 2 (Two) Times a Day As Needed. 1-2 tabs BID PRN    [DISCONTINUED] traZODone (DESYREL) 100 MG tablet Take 1 tablet by mouth every night at bedtime.    hydrOXYzine pamoate (VISTARIL) 25 MG capsule Take 1-2 capsules by mouth 2 (Two) Times a Day.    traZODone (DESYREL) 100 MG tablet Take 1 tablet by mouth every night at bedtime.    [DISCONTINUED] busPIRone (BUSPAR) 10 MG tablet Take 1 tablet by mouth 2 (Two) Times a Day.    [DISCONTINUED] busPIRone (BUSPAR) 7.5 MG tablet Take 1 tablet by mouth 2 (Two) Times a Day.    [DISCONTINUED] cholecalciferol (VITAMIN D3) 1.25 MG (72729 UT) capsule Take  by mouth.    [DISCONTINUED] hydrOXYzine pamoate (VISTARIL) 25 MG capsule Take 1-2 capsules by mouth 2 (Two) Times a Day As Needed.    [DISCONTINUED] hydrOXYzine pamoate (VISTARIL) 25 MG capsule Take 1-2 capsule(s) by mouth 2 Times a Day.    [DISCONTINUED] hydrOXYzine pamoate (VISTARIL) 25 MG capsule Take 1-2 capsule(s) by mouth 2 Times a Day.    [DISCONTINUED] omeprazole (priLOSEC) 40 MG capsule Take 1 capsule by mouth Daily.    [DISCONTINUED] tiZANidine (Zanaflex) 4 MG tablet Take 1 tablet by mouth At Night As Needed for Muscle Spasms.    [DISCONTINUED] traZODone (DESYREL) 100 MG tablet Take 1 tablet by mouth every night at bedtime.    [DISCONTINUED] traZODone (DESYREL) 100 MG tablet Take 1 tablet by mouth every night at bedtime.     No current facility-administered medications on file prior to visit.       Immunization History   Administered Date(s) Administered    COVID-19 (MODERNA) 1st,2nd,3rd Dose Monovalent 07/28/2021, 08/25/2021    COVID-19 (UNSPECIFIED) 07/28/2021, 08/25/2021    Flu Vaccine Quad PF >36MO 11/04/2021    Fluzone  >6mos 10/30/2024    Fluzone (or Fluarix & Flulaval for VFC) >6mos 11/04/2021, 11/01/2022    Tdap 03/13/2019       Review of Systems   Constitutional:  Positive for fatigue.   HENT:  Negative for  "trouble swallowing.    Eyes:  Positive for blurred vision.   Respiratory:  Negative for shortness of breath.    Cardiovascular:  Negative for chest pain and palpitations.   Gastrointestinal:  Negative for abdominal pain and blood in stool.   Endocrine: Negative for polydipsia, polyphagia and polyuria.   Genitourinary:  Negative for dysuria and vaginal bleeding.   Neurological:  Positive for dizziness, light-headedness and headache. Negative for seizures and syncope.   Hematological:  Negative for adenopathy.   Psychiatric/Behavioral:  Negative for self-injury and suicidal ideas.         Objective     /82 (BP Location: Left arm, Patient Position: Sitting)   Pulse 95   Temp 97.3 °F (36.3 °C)   Ht 168.9 cm (66.5\")   Wt 78.4 kg (172 lb 12.8 oz)   SpO2 97%   BMI 27.47 kg/m²       Physical Exam  Vitals and nursing note reviewed.   Constitutional:       Appearance: Normal appearance.      Comments: Overwt--BMI 27.47   HENT:      Head: Normocephalic.      Right Ear: External ear normal.      Left Ear: External ear normal.      Nose: Nose normal.      Mouth/Throat:      Mouth: Mucous membranes are moist.   Eyes:      Pupils: Pupils are equal, round, and reactive to light.   Cardiovascular:      Rate and Rhythm: Normal rate and regular rhythm.      Heart sounds: Normal heart sounds.   Pulmonary:      Effort: Pulmonary effort is normal.      Breath sounds: Normal breath sounds.   Abdominal:      Palpations: Abdomen is soft.      Tenderness: There is no abdominal tenderness.   Musculoskeletal:         General: Normal range of motion.      Cervical back: Normal range of motion and neck supple.   Skin:     General: Skin is warm and dry.   Neurological:      Mental Status: She is alert and oriented to person, place, and time.   Psychiatric:         Mood and Affect: Mood normal.         Behavior: Behavior normal.         Thought Content: Thought content normal.         Judgment: Judgment normal.         Result Review : "     The following data was reviewed by: CLINTON Buchanan on 06/11/2025:      PDF Report (10/30/2024 11:53)   IgP, Aptima HPV (10/30/2024 11:53)   Mammo Screening Digital Tomosynthesis Bilateral With CAD (11/07/2024 11:31)      ECG 12 Lead Syncope (12/13/2022 13:29)   CT Chest Low Dose Cancer Screening WO (12/13/2024 10:09)   Results          ECG 12 Lead    Date/Time: 6/11/2025 4:02 PM  Performed by: Estela Lopez APRN    Authorized by: Estela Lopez APRN  Comparison: compared with previous ECG from 12/13/2022  Similar to previous ECG  Rhythm: sinus rhythm  Rate: normal  Conduction: conduction normal  ST Segments: ST segments normal  T Waves: T waves normal  QRS axis: normal  Other: no other findings    Clinical impression: normal ECG              Assessment and Plan      Diagnoses and all orders for this visit:    1. Annual physical exam (Primary)  Comments:  pap UTD and mammo UTD  Orders:  -     ECG 12 Lead  -     lisinopril (PRINIVIL,ZESTRIL) 5 MG tablet; Take 1 tablet by mouth Daily.  Dispense: 90 tablet; Refill: 0  -     CBC & Differential  -     Comprehensive Metabolic Panel  -     Lipid Panel  -     TSH Rfx On Abnormal To Free T4  -     Urinalysis With Culture If Indicated -  -     Vitamin B12 & Folate  -     Vitamin D,25-Hydroxy    2. Primary hypertension  -     ECG 12 Lead  -     lisinopril (PRINIVIL,ZESTRIL) 5 MG tablet; Take 1 tablet by mouth Daily.  Dispense: 90 tablet; Refill: 0  -     CBC & Differential  -     Comprehensive Metabolic Panel  -     Lipid Panel  -     TSH Rfx On Abnormal To Free T4  -     Urinalysis With Culture If Indicated -    3. Overweight (BMI 25.0-29.9)  -     Tirzepatide-Weight Management (ZEPBOUND) 2.5 MG/0.5ML solution; Inject 0.5 mL under the skin into the appropriate area as directed 1 (One) Time Per Week.  Dispense: 2 mL; Refill: 0  -     CBC & Differential  -     Comprehensive Metabolic Panel  -     Lipid Panel  -     TSH Rfx On Abnormal To Free T4  -      Urinalysis With Culture If Indicated -    4. Fatigue, unspecified type  -     Vitamin B12 & Folate  -     Vitamin D,25-Hydroxy    5. Colon cancer screening declined    We will start the patient on the lowest dose of lisinopril 5 mg patient will continue to monitor and stay well-hydrated I did discuss with her any side effects to watch for such as an ACE inhibitor cough and then she will follow-up in 1 month so that we can reevaluate at that time obtaining EKG today for baseline --and was completely normal    We are ordering labs for the hypertension panel as well as annual wellness from the primary care standpoint and the BMI of 27.47    And then patient signed the [facility] consent form for compounded medication and her goal is to lose 10 to 20 pounds-and she just wants to start off with a low dose just to see if she tolerates well-and we did discuss in detail all the side effects to watch for big red flags contraindications side effects and any adverse reactions-and patient reports her understanding we did discuss nausea vomiting diarrhea constipation vision changes lumps in throat trouble swallowing abdominal pain etc. and that the patient understands to follow-up or contact the office or go to the ER ASAP    And then also we discussed proper injection as well      Assessment & Plan  1. Hypertension.  Her blood pressure readings have consistently been elevated, with a reading of 144/98 on 03/05/2025 and 140/82 today. She reports symptoms such as headaches, dizziness, lightheadedness, and blurred vision, which are likely related to her hypertension.  Increased pain and limited mobility.  A prescription for lisinopril 5 mg has been issued to manage her hypertension. A baseline EKG will be conducted today. Additionally, a comprehensive panel including blood count, kidney function, liver function, electrolytes, sugar levels, cholesterol, thyroid function, and urinalysis will be ordered. If there is no  improvement in her blood pressure readings, the dosage of lisinopril may be increased.    2. Weight management.  She is currently on Wellbutrin 300 mg. Her BMI is slightly elevated at 27.47. She has expressed interest in weight loss medications such as Ozempic or Wegovy. However, these are typically prescribed for diabetic patients.  Review of records and discussion regarding weight loss medications.  A prescription for Zepbound compounded with B12 has been issued through [facility] in [place], [place]. She has been advised to administer the injection subcutaneously either above or below the umbilicus, alternating sides each week. Potential side effects including nausea, vomiting, constipation, lumps in the throat, trouble swallowing, sudden vision changes, severe abdominal pain, long-term gastroparesis, gallbladder issues, and diabetic retinopathy have been discussed. She has been advised to maintain adequate hydration and consume healthy meals. She has also been encouraged to engage in physical activity such as walking for 30 to 60 minutes daily.    3. Tobacco use.  She has been smoking for 40 years and has not set a quit date.  Discussion regarding tobacco use and its impact on health.  No specific treatment or referral for smoking cessation has been initiated at this time.    4. Health maintenance.  She is up to date on CT of the chest. She has not completed the Cologuard test and declines a colonoscopy due to personal discomfort.  Review of health maintenance records and discussion regarding colorectal screening.  No further action required for CT of the chest. Patient declines colonoscopy and Cologuard test.    Follow-up  The patient will follow up in 4 weeks.          Follow Up     Return in about 4 weeks (around 7/9/2025), or if symptoms worsen or fail to improve, for Recheck.    Patient was given instructions and counseling regarding her condition or for health maintenance advice.  Please see specific information pulled into the AVS if appropriate.            Deb Rayo  reports that she has been smoking cigarettes. She started smoking about 41 years ago. She has a 41.4 pack-year smoking history. She has been exposed to tobacco smoke. She has never used smokeless tobacco. I have educated her on the risk of diseases from using tobacco products such as cancer, COPD, and heart disease.     I advised her to quit and she is not willing to quit.    I spent 3  minutes counseling the patient.           Patient or patient representative verbalized consent for the use of Ambient Listening during the visit with  CLINTON Buchanan for chart documentation. 6/11/2025  15:50 EDT

## 2025-06-11 NOTE — PROGRESS NOTES
..  Venipuncture Blood Specimen Collection  Venipuncture performed in LT arm by Philomena Banks with good hemostasis. Patient tolerated the procedure well without complications.   06/11/25   Nancy Lord MA

## 2025-06-12 ENCOUNTER — RESULTS FOLLOW-UP (OUTPATIENT)
Dept: FAMILY MEDICINE CLINIC | Facility: CLINIC | Age: 60
End: 2025-06-12
Payer: COMMERCIAL

## 2025-06-12 DIAGNOSIS — E55.9 VITAMIN D DEFICIENCY: Primary | ICD-10-CM

## 2025-06-12 RX ORDER — ERGOCALCIFEROL 1.25 MG/1
50000 CAPSULE, LIQUID FILLED ORAL WEEKLY
Qty: 12 CAPSULE | Refills: 0 | Status: SHIPPED | OUTPATIENT
Start: 2025-06-12

## 2025-06-12 NOTE — PROGRESS NOTES
Please mail letter to patient stating    Deb comprehensive panel shows glucose 101 and if you were fasting it should be less than 100 when fasting and normal kidney and liver function test and normal electrolytes and then the cholesterol panel shows normal triglycerides excellent HDL and elevated LDL at 155 and 2 years ago was at 160 and it should be actually less than 100 if fasting and then you would need to do a low-cholesterol diet avoiding fast foods and fried foods and do more baked broiled boiled grilled foods and then make sure you are getting plenty of exercise like walking 30 to 60 minutes daily and then we could recheck those levels before placing you on a cholesterol medication    And then your vitamin D was deficient so we will need to send in the high-dose once weekly vitamin D2 50,000 IUs and you will take 1 tablet once weekly for 12 weeks and I will send that into your pharmacy and then after that you would need to follow-up and get rechecked    And then your blood counts were in normal range and your thyroid levels are normal range and your folic acid was normal range and your vitamin B12 was in that very low end of normal at 236 and normal is 211-946 and you could opt to start taking a daily over-the-counter vitamin B12 1000 mcg daily for this or the other option would be to have a vitamin B12 shot once monthly for about 6 months and then we could recheck levels-just let me know if you want this serum sent in    And then your urinalysis was normal

## 2025-06-12 NOTE — LETTER
Deb Rayo  [address] [place] [place] [address]    June 12, 2025     Dear Ms. Rayo:    Deb comprehensive panel shows glucose 101 and if you were fasting it should be less than 100 when fasting and normal kidney and liver function test and normal electrolytes and then the cholesterol panel shows normal triglycerides excellent HDL and elevated LDL at 155 and 2 years ago was at 160 and it should be actually less than 100 if fasting and then you would need to do a low-cholesterol diet avoiding fast foods and fried foods and do more baked broiled boiled grilled foods and then make sure you are getting plenty of exercise like walking 30 to 60 minutes daily and then we could recheck those levels before placing you on a cholesterol medication     And then your vitamin D was deficient so we will need to send in the high-dose once weekly vitamin D2 50,000 IUs and you will take 1 tablet once weekly for 12 weeks and I will send that into your pharmacy and then after that you would need to follow-up and get rechecked     And then your blood counts were in normal range and your thyroid levels are normal range and your folic acid was normal range and your vitamin B12 was in that very low end of normal at 236 and normal is 211-946 and you could opt to start taking a daily over-the-counter vitamin B12 1000 mcg daily for this or the other option would be to have a vitamin B12 shot once monthly for about 6 months and then we could recheck levels-just let me know if you want this serum sent in     And then your urinalysis was normal       Resulted Orders   Comprehensive Metabolic Panel   Result Value Ref Range    Glucose 101 (H) 65 - 99 mg/dL    BUN 14.0 8.0 - 23.0 mg/dL    Creatinine 0.90 0.57 - 1.00 mg/dL    Sodium 141 136 - 145 mmol/L    Potassium 4.5 3.5 - 5.2 mmol/L    Chloride 105 98 - 107 mmol/L    CO2 26.6 22.0 - 29.0 mmol/L    Calcium 10.0 8.6 - 10.5 mg/dL    Total Protein 7.6 6.0 - 8.5 g/dL    Albumin 4.5 3.5  - 5.2 g/dL    ALT (SGPT) 14 1 - 33 U/L    AST (SGOT) 23 1 - 32 U/L    Alkaline Phosphatase 79 39 - 117 U/L    Total Bilirubin 0.4 0.0 - 1.2 mg/dL    Globulin 3.1 gm/dL    A/G Ratio 1.5 g/dL    BUN/Creatinine Ratio 15.6 7.0 - 25.0    Anion Gap 9.4 5.0 - 15.0 mmol/L    eGFR 73.3 >60.0 mL/min/1.73   Lipid Panel   Result Value Ref Range    Total Cholesterol 255 (H) 0 - 200 mg/dL    Triglycerides 117 0 - 150 mg/dL    HDL Cholesterol 80 (H) 40 - 60 mg/dL    LDL Cholesterol  155 (H) 0 - 100 mg/dL    VLDL Cholesterol 20 5 - 40 mg/dL    LDL/HDL Ratio 1.90    TSH Rfx On Abnormal To Free T4   Result Value Ref Range    TSH 1.540 0.270 - 4.200 uIU/mL   Urinalysis With Culture If Indicated - Urine, Clean Catch   Result Value Ref Range    Color, UA Yellow Yellow, Straw    Appearance, UA Clear Clear    pH, UA 5.5 5.0 - 8.0    Specific Gravity, UA 1.022 1.005 - 1.030    Glucose, UA Negative Negative    Ketones, UA Negative Negative    Bilirubin, UA Negative Negative    Blood, UA Negative Negative    Protein, UA Negative Negative    Leuk Esterase, UA Negative Negative    Nitrite, UA Negative Negative    Urobilinogen, UA 0.2 E.U./dL 0.2 - 1.0 E.U./dL   Vitamin B12 & Folate   Result Value Ref Range    Folate 9.55 4.78 - 24.20 ng/mL    Vitamin B-12 236 211 - 946 pg/mL   Vitamin D,25-Hydroxy   Result Value Ref Range    25 Hydroxy, Vitamin D 20.1 (L) 30.0 - 100.0 ng/ml   CBC Auto Differential   Result Value Ref Range    WBC 6.88 3.40 - 10.80 10*3/mm3    RBC 3.92 3.77 - 5.28 10*6/mm3    Hemoglobin 13.5 12.0 - 15.9 g/dL    Hematocrit 39.8 34.0 - 46.6 %    .5 (H) 79.0 - 97.0 fL    MCH 34.4 (H) 26.6 - 33.0 pg    MCHC 33.9 31.5 - 35.7 g/dL    RDW 11.9 (L) 12.3 - 15.4 %    RDW-SD 44.5 37.0 - 54.0 fl    MPV 12.7 (H) 6.0 - 12.0 fL    Platelets 178 140 - 450 10*3/mm3    Neutrophil % 60.0 42.7 - 76.0 %    Lymphocyte % 28.5 19.6 - 45.3 %    Monocyte % 9.0 5.0 - 12.0 %    Eosinophil % 1.5 0.3 - 6.2 %    Basophil % 0.7 0.0 - 1.5 %    Immature  Grans % 0.3 0.0 - 0.5 %    Neutrophils, Absolute 4.13 1.70 - 7.00 10*3/mm3    Lymphocytes, Absolute 1.96 0.70 - 3.10 10*3/mm3    Monocytes, Absolute 0.62 0.10 - 0.90 10*3/mm3    Eosinophils, Absolute 0.10 0.00 - 0.40 10*3/mm3    Basophils, Absolute 0.05 0.00 - 0.20 10*3/mm3    Immature Grans, Absolute 0.02 0.00 - 0.05 10*3/mm3    nRBC 0.0 0.0 - 0.2 /100 WBC   [place] Urine Culture Tube - Urine, Clean Catch   Result Value Ref Range    Extra Tube Hold for add-ons.       Comment:      Auto resulted.         If you have any questions or concerns, please don't hesitate to call.         Sincerely,        CLINTON Buchanan

## 2025-07-08 DIAGNOSIS — M70.62 TROCHANTERIC BURSITIS OF BOTH HIPS: ICD-10-CM

## 2025-07-08 DIAGNOSIS — M70.61 TROCHANTERIC BURSITIS OF BOTH HIPS: ICD-10-CM

## 2025-07-09 RX ORDER — DICLOFENAC SODIUM 75 MG/1
75 TABLET, DELAYED RELEASE ORAL 2 TIMES DAILY
Qty: 60 TABLET | Refills: 3 | OUTPATIENT
Start: 2025-07-09

## 2025-07-10 ENCOUNTER — OFFICE VISIT (OUTPATIENT)
Dept: FAMILY MEDICINE CLINIC | Facility: CLINIC | Age: 60
End: 2025-07-10
Payer: COMMERCIAL

## 2025-07-10 VITALS
OXYGEN SATURATION: 98 % | SYSTOLIC BLOOD PRESSURE: 116 MMHG | HEART RATE: 92 BPM | DIASTOLIC BLOOD PRESSURE: 68 MMHG | WEIGHT: 166 LBS | BODY MASS INDEX: 26.06 KG/M2 | TEMPERATURE: 97.5 F | HEIGHT: 67 IN

## 2025-07-10 DIAGNOSIS — R53.83 EASY FATIGABILITY: ICD-10-CM

## 2025-07-10 DIAGNOSIS — Z82.49 FAMILY HISTORY OF HEART DISEASE: ICD-10-CM

## 2025-07-10 DIAGNOSIS — Z09 FOLLOW-UP EXAM: ICD-10-CM

## 2025-07-10 DIAGNOSIS — E66.3 OVERWEIGHT (BMI 25.0-29.9): ICD-10-CM

## 2025-07-10 DIAGNOSIS — I10 PRIMARY HYPERTENSION: Primary | ICD-10-CM

## 2025-07-10 DIAGNOSIS — R42 EPISODE OF DIZZINESS: ICD-10-CM

## 2025-07-10 DIAGNOSIS — R73.09 ELEVATED GLUCOSE LEVEL: ICD-10-CM

## 2025-07-10 DIAGNOSIS — R07.89 ATYPICAL CHEST PAIN: ICD-10-CM

## 2025-07-10 LAB — HBA1C MFR BLD: 4.9 % (ref 4.8–5.6)

## 2025-07-10 PROCEDURE — 83036 HEMOGLOBIN GLYCOSYLATED A1C: CPT | Performed by: NURSE PRACTITIONER

## 2025-07-10 NOTE — PROGRESS NOTES
Chief Complaint  Obesity (Follow up from [facility]) and Annual Exam (Follow up on blood pressure as well.)    Subjective            Deb Rayo presents to [facility]  Obesity  Symptoms: chest pain, fatigue, nausea and vomiting    Symptoms: no headaches    Primary Care Follow-Up  Conditions present: hypertension    Current symptoms: chest pain, dizziness, nausea, fatigue, blurred vision and diarrhea      Current symptoms: no confusion, no dry mouth, no palpitations, no shortness of breath, no weight gain, no weight loss, no polydipsia, no polyphagia, no polyuria, no headaches and no trouble swallowing    Side effects:  Fatigue  Treatment compliance:  All of the time  Exercise:  Never  Hyperlipidemia:     Exacerbating diseases: obesity        History of Present Illness  The patient is a 59-year-old female who presents today for a 1-month follow-up regarding weight loss management as well as blood pressure.    She has been experiencing severe indigestion, which she initially mistook for heart attack symptoms. The symptoms were so intense that she considered seeking emergency care. She also experienced vomiting, which was a new symptom for her. Today was supposed to be her third injection of Wegovy, but she decided to consult today here in this visit before proceeding. She has been taking Zantac every morning to manage her symptoms, which have been disrupting her sleep. Despite this, she continues to experience nausea, vomiting, and diarrhea. She has only received two doses of the medication so far. She has lost 6 pounds in the past month.  And she does have the significant history of Bernal's esophagus as well    She reports feeling exhausted after performing simple tasks such as showering or cooking dinner. She has never undergone a stress test. She has a family history of congestive heart failure in her father, who underwent a quadruple bypass surgery about 20 years ago.    Her  blood pressure readings have been inconsistent, with systolic readings ranging from 105 to 134 and diastolic readings from 72 to 94. She takes her blood pressure medication at 10:00 AM daily.    She has been experiencing visual disturbances, describing it as looking through a bubble--Almost like a faint feeling-- this symptom has been occurring more frequently recently. She reports no excessive hunger, thirst, or urination.     She also reports no difficulty swallowing, feeling of a lump in her throat, or acute vision changes. She has a history of fainting and seizures, but not in conjunction with her current symptoms.    PAST SURGICAL HISTORY:  She has a history of Bernal's esophagus, skin cancer, and anal fissures.     FAMILY HISTORY  Her father has congestive heart failure and had a quadruple bypass about 20 years ago.  Her father also had Bernal's esophagus, skin cancer, and anal fissures.  Her mother  of lung cancer.  Her aunt on her mother's side had breast cancer.      PHQ-2 Total Score: 0    PHQ-9 Total Score:        Past Medical History:   Diagnosis Date    Arthritis     recently diagnosed    Cancer 10 yrs ago    skin cancer inc melanoma    Cervical disc disorder     CTS (carpal tunnel syndrome)     Low back pain     Lumbosacral disc disease        Allergies   Allergen Reactions    Morphine Itching        Past Surgical History:   Procedure Laterality Date    BREAST BIOPSY Left         Social History     Tobacco Use    Smoking status: Every Day     Current packs/day: 1.00     Average packs/day: 1 pack/day for 41.5 years (41.5 ttl pk-yrs)     Types: Cigarettes     Start date: 1984     Passive exposure: Past    Smokeless tobacco: Never   Vaping Use    Vaping status: Never Used   Substance Use Topics    Alcohol use: Yes     Alcohol/week: 6.0 standard drinks of alcohol     Types: 6 Cans of beer per week     Comment: BEERS ON WEEKENDS    Drug use: Never     Comment: Hemp gummies NOT THC       Family  History   Problem Relation Age of Onset    Ovarian cancer Mother     Lung cancer Mother     Cancer Mother     Heart disease Father     No Known Problems Brother     Breast cancer Maternal Aunt     Breast cancer Maternal Aunt     Cancer Maternal Aunt     COPD Maternal Grandmother         There are no preventive care reminders to display for this patient.     Current Outpatient Medications on File Prior to Visit   Medication Sig    buPROPion XL (Wellbutrin XL) 300 MG 24 hr tablet Take 1 tablet by mouth Every Morning.    busPIRone (BUSPAR) 15 MG tablet Take 1 tablet by mouth 2 (Two) Times a Day.    diclofenac (VOLTAREN) 75 MG EC tablet Take 1 tablet by mouth 2 (Two) Times a Day.    hydrOXYzine pamoate (VISTARIL) 25 MG capsule Take 1-2 capsules by mouth 2 (Two) Times a Day.    lisinopril (PRINIVIL,ZESTRIL) 5 MG tablet Take 1 tablet by mouth Daily.    traZODone (DESYREL) 100 MG tablet Take 1 tablet by mouth every night at bedtime.    vitamin D (ERGOCALCIFEROL) 1.25 MG (46150 UT) capsule capsule Take 1 capsule by mouth 1 (One) Time Per Week.    [DISCONTINUED] Tirzepatide-Weight Management (ZEPBOUND) 2.5 MG/0.5ML solution Inject 0.5 mL under the skin into the appropriate area as directed 1 (One) Time Per Week.     No current facility-administered medications on file prior to visit.       Immunization History   Administered Date(s) Administered    COVID-19 (MODERNA) 1st,2nd,3rd Dose Monovalent 07/28/2021, 08/25/2021    COVID-19 (UNSPECIFIED) 07/28/2021, 08/25/2021    Flu Vaccine Quad PF >36MO 11/04/2021    Fluzone  >6mos 10/30/2024    Fluzone (or Fluarix & Flulaval for VFC) >6mos 11/04/2021, 11/01/2022    Tdap 03/13/2019       Review of Systems   Constitutional:  Positive for fatigue. Negative for unexpected weight gain and unexpected weight loss.   HENT:  Negative for trouble swallowing.    Eyes:  Positive for blurred vision. Negative for double vision and visual disturbance.   Respiratory:  Negative for shortness of  "breath.    Cardiovascular:  Positive for chest pain. Negative for palpitations.        Severe indigestion after the shot on a daily basis and then pt reports having to take the zantac OTC daily and pepto and still experiencing N/V/D--CP is the sever indigestion    Gastrointestinal:  Positive for diarrhea, nausea and vomiting.   Endocrine: Negative for polydipsia, polyphagia and polyuria.   Neurological:  Positive for dizziness and light-headedness. Negative for seizures, syncope and confusion.   Hematological:  Negative for adenopathy.        Objective     /68 (BP Location: Right arm, Patient Position: Sitting, Cuff Size: Adult)   Pulse 92   Temp 97.5 °F (36.4 °C) (Temporal)   Ht 168.9 cm (66.5\")   Wt 75.3 kg (166 lb)   SpO2 98%   BMI 26.39 kg/m²       Physical Exam  Vitals and nursing note reviewed.   Constitutional:       Appearance: Normal appearance.      Comments: Dropped 6# in the past 1 month and BMI dropped from 27.5 to 26.39   HENT:      Head: Normocephalic.      Right Ear: External ear normal.      Left Ear: External ear normal.      Nose: Nose normal.      Mouth/Throat:      Mouth: Mucous membranes are moist.   Eyes:      Pupils: Pupils are equal, round, and reactive to light.   Cardiovascular:      Rate and Rhythm: Normal rate and regular rhythm.      Heart sounds: Normal heart sounds.   Pulmonary:      Effort: Pulmonary effort is normal.      Breath sounds: Normal breath sounds.   Abdominal:      General: Bowel sounds are normal.      Palpations: Abdomen is soft.      Tenderness: There is no abdominal tenderness.   Musculoskeletal:         General: Normal range of motion.      Cervical back: Normal range of motion and neck supple.   Skin:     General: Skin is warm and dry.   Neurological:      Mental Status: She is alert and oriented to person, place, and time.   Psychiatric:         Mood and Affect: Mood normal.         Behavior: Behavior normal.         Thought Content: Thought content " normal.         Judgment: Judgment normal.         Result Review :     The following data was reviewed by: CLINTON Buchanan on 07/10/2025:             MEDICATIONS - SCAN - REFILL FAX TIRZEPATIDE/CYANOCOBALAMIN, [facility], 06/19/2025 (06/19/2025)   Results  Labs   - Glucose level: 101 mg/dL    Diagnostic Testing   - EKG: Normal               Assessment and Plan      Diagnoses and all orders for this visit:    1. Primary hypertension (Primary)  -     Ambulatory Referral to Cardiology for Chest Pain, Hypertension, Other; significant family Hxof CAD    2. Atypical chest pain  -     Ambulatory Referral to Cardiology for Chest Pain, Hypertension, Other; significant family Hxof CAD    3. Episode of dizziness  -     Ambulatory Referral to Cardiology for Chest Pain, Hypertension, Other; significant family Hxof CAD    4. Easy fatigability  -     Ambulatory Referral to Cardiology for Chest Pain, Hypertension, Other; significant family Hxof CAD    5. Overweight (BMI 25.0-29.9)  Comments:  stopping the generic zepbound    6. Family history of heart disease, father  -     Ambulatory Referral to Cardiology for Chest Pain, Hypertension, Other; significant family Hxof CAD    7. Elevated glucose level  -     Hemoglobin A1c    8. Follow-up exam    We are obtaining an A1c today  We are continuing the lisinopril to 5 mg and she will continue to monitor her blood pressure  And then referral to cardiology  And then we are stopping the GLP-1 related to all the side effects      Assessment & Plan  1. Weight loss management.  - Severe indigestion, nausea, vomiting, and diarrhea after taking Wegovy (semaglutide) injections.  - Symptoms persist despite taking Zantac OTC and Pepto daily.  - Decision made to discontinue the medication temporarily to see if symptoms improve.  - Two doses of Wegovy remain if she wishes to resume.    2. Blood pressure management.  - Blood pressure readings have fluctuated, with recent readings of  108/94 and 144/89.  - Today's reading is 116/68, which is within the normal range.  - Continue current regimen of lisinopril 5 mg.    3. Chest pain.  - Reports severe indigestion and chest pain, possibly related to GLP-1 medication.  - Family history of coronary artery disease and easy fatigability noted.  - Referral to cardiology recommended for further evaluation and potential stress testing.    4. Prediabetes.  - Glucose levels have been slightly elevated over the years, with the most recent reading at 101.  - A1c test will be conducted today to assess average blood glucose levels over the past three months.          Follow Up     Return if symptoms worsen or fail to improve.    Patient was given instructions and counseling regarding her condition or for health maintenance advice. Please see specific information pulled into the AVS if appropriate.            Deb Rayo  reports that she has been smoking cigarettes. She started smoking about 41 years ago. She has a 41.5 pack-year smoking history. She has been exposed to tobacco smoke. She has never used smokeless tobacco. I have educated her on the risk of diseases from using tobacco products such as cancer, COPD, and heart disease.              Patient or patient representative verbalized consent for the use of Ambient Listening during the visit with  CLINTON Buchanan for chart documentation. 7/10/2025  16:55 EDT

## 2025-07-10 NOTE — PROGRESS NOTES
Venipuncture Blood Specimen Collection  Venipuncture performed in left arm by Philomena Banks with good hemostasis. Patient tolerated the procedure well without complications.   07/10/25   Nelly Lamar

## 2025-07-12 ENCOUNTER — RESULTS FOLLOW-UP (OUTPATIENT)
Dept: FAMILY MEDICINE CLINIC | Facility: CLINIC | Age: 60
End: 2025-07-12
Payer: COMMERCIAL

## 2025-07-12 NOTE — LETTER
eDb Rayo  [address] [facility] [place] [address]    July 12, 2025     Dear Ms. Rayo:    Deb the hemoglobin A1c was in perfect range with an A1c of 4.9% and 2 years ago you are at 5.1%     Resulted Orders   Hemoglobin A1c   Result Value Ref Range    Hemoglobin A1C 4.90 4.80 - 5.60 %     Sincerely,        Estela Lopez, APRN

## 2025-07-12 NOTE — PROGRESS NOTES
Please mail letter to patient stating    Deb the hemoglobin A1c was in perfect range with an A1c of 4.9% and 2 years ago you are at 5.1%

## 2025-08-13 ENCOUNTER — OFFICE VISIT (OUTPATIENT)
Dept: FAMILY MEDICINE CLINIC | Facility: CLINIC | Age: 60
End: 2025-08-13
Payer: COMMERCIAL

## 2025-08-13 VITALS
HEIGHT: 67 IN | SYSTOLIC BLOOD PRESSURE: 120 MMHG | WEIGHT: 167.4 LBS | TEMPERATURE: 98.2 F | BODY MASS INDEX: 26.27 KG/M2 | DIASTOLIC BLOOD PRESSURE: 72 MMHG | HEART RATE: 116 BPM | OXYGEN SATURATION: 96 %

## 2025-08-13 DIAGNOSIS — L05.01 PILONIDAL CYST WITH ABSCESS: Primary | ICD-10-CM

## 2025-08-13 PROCEDURE — 87186 SC STD MICRODIL/AGAR DIL: CPT | Performed by: NURSE PRACTITIONER

## 2025-08-13 PROCEDURE — 87077 CULTURE AEROBIC IDENTIFY: CPT | Performed by: NURSE PRACTITIONER

## 2025-08-13 PROCEDURE — 87205 SMEAR GRAM STAIN: CPT | Performed by: NURSE PRACTITIONER

## 2025-08-13 PROCEDURE — 87070 CULTURE OTHR SPECIMN AEROBIC: CPT | Performed by: NURSE PRACTITIONER

## 2025-08-13 RX ORDER — DIBUCAINE 1% 1 G/100G
1 CREAM TOPICAL 3 TIMES DAILY
Qty: 28 G | Refills: 0 | Status: SHIPPED | OUTPATIENT
Start: 2025-08-13

## 2025-08-13 RX ORDER — MUPIROCIN 2 %
1 OINTMENT (GRAM) TOPICAL 3 TIMES DAILY
Qty: 30 G | Refills: 0 | Status: SHIPPED | OUTPATIENT
Start: 2025-08-13

## 2025-08-13 RX ORDER — SULFAMETHOXAZOLE AND TRIMETHOPRIM 800; 160 MG/1; MG/1
1 TABLET ORAL 2 TIMES DAILY
Qty: 20 TABLET | Refills: 0 | Status: SHIPPED | OUTPATIENT
Start: 2025-08-13

## 2025-08-16 LAB
BACTERIA SPEC AEROBE CULT: ABNORMAL
BACTERIA SPEC AEROBE CULT: ABNORMAL
GRAM STN SPEC: ABNORMAL

## 2025-08-18 ENCOUNTER — RESULTS FOLLOW-UP (OUTPATIENT)
Dept: FAMILY MEDICINE CLINIC | Facility: CLINIC | Age: 60
End: 2025-08-18
Payer: COMMERCIAL

## 2025-08-18 ENCOUNTER — OFFICE VISIT (OUTPATIENT)
Dept: SURGERY | Facility: CLINIC | Age: 60
End: 2025-08-18
Payer: COMMERCIAL

## 2025-08-18 ENCOUNTER — CLINICAL SUPPORT (OUTPATIENT)
Dept: FAMILY MEDICINE CLINIC | Facility: CLINIC | Age: 60
End: 2025-08-18
Payer: COMMERCIAL

## 2025-08-18 VITALS — WEIGHT: 165.6 LBS | HEIGHT: 66 IN | RESPIRATION RATE: 18 BRPM | BODY MASS INDEX: 26.61 KG/M2

## 2025-08-18 DIAGNOSIS — L08.9 SOFT TISSUE INFECTION: Primary | ICD-10-CM

## 2025-08-18 DIAGNOSIS — L05.01 PILONIDAL CYST WITH ABSCESS: ICD-10-CM

## 2025-08-18 DIAGNOSIS — K61.0 PERIANAL ABSCESS: Primary | ICD-10-CM

## 2025-08-18 PROCEDURE — 96372 THER/PROPH/DIAG INJ SC/IM: CPT | Performed by: NURSE PRACTITIONER

## 2025-08-18 PROCEDURE — 99202 OFFICE O/P NEW SF 15 MIN: CPT | Performed by: SURGERY

## 2025-08-18 RX ORDER — MUPIROCIN 2 %
1 OINTMENT (GRAM) TOPICAL 3 TIMES DAILY
Qty: 30 G | Refills: 0 | Status: CANCELLED | OUTPATIENT
Start: 2025-08-18

## 2025-08-19 ENCOUNTER — CLINICAL SUPPORT (OUTPATIENT)
Dept: FAMILY MEDICINE CLINIC | Facility: CLINIC | Age: 60
End: 2025-08-19
Payer: COMMERCIAL

## 2025-08-19 DIAGNOSIS — L08.9 SOFT TISSUE INFECTION: Primary | ICD-10-CM

## 2025-08-19 DIAGNOSIS — L05.01 PILONIDAL CYST WITH ABSCESS: ICD-10-CM

## 2025-08-19 PROCEDURE — 96372 THER/PROPH/DIAG INJ SC/IM: CPT | Performed by: NURSE PRACTITIONER

## 2025-08-19 RX ORDER — MUPIROCIN 2 %
1 OINTMENT (GRAM) TOPICAL 3 TIMES DAILY
Qty: 30 G | Refills: 0 | Status: CANCELLED | OUTPATIENT
Start: 2025-08-18

## 2025-08-20 ENCOUNTER — CLINICAL SUPPORT (OUTPATIENT)
Dept: FAMILY MEDICINE CLINIC | Facility: CLINIC | Age: 60
End: 2025-08-20
Payer: COMMERCIAL

## 2025-08-20 DIAGNOSIS — L08.9 SOFT TISSUE INFECTION: Primary | ICD-10-CM

## 2025-08-20 DIAGNOSIS — L05.01 PILONIDAL CYST WITH ABSCESS: ICD-10-CM

## 2025-08-20 PROCEDURE — 96372 THER/PROPH/DIAG INJ SC/IM: CPT | Performed by: NURSE PRACTITIONER

## 2025-08-20 RX ORDER — MUPIROCIN 2 %
1 OINTMENT (GRAM) TOPICAL 3 TIMES DAILY
Qty: 30 G | Refills: 0 | Status: CANCELLED | OUTPATIENT
Start: 2025-08-18